# Patient Record
Sex: FEMALE | Race: WHITE | NOT HISPANIC OR LATINO | Employment: FULL TIME | URBAN - METROPOLITAN AREA
[De-identification: names, ages, dates, MRNs, and addresses within clinical notes are randomized per-mention and may not be internally consistent; named-entity substitution may affect disease eponyms.]

---

## 2023-06-15 DIAGNOSIS — O34.31 INCOMPETENT CERVIX IN PREGNANCY, ANTEPARTUM, FIRST TRIMESTER: Primary | ICD-10-CM

## 2023-06-19 ENCOUNTER — OFFICE VISIT (OUTPATIENT)
Dept: MATERNAL FETAL MEDICINE | Facility: CLINIC | Age: 23
End: 2023-06-19
Payer: MEDICAID

## 2023-06-19 ENCOUNTER — PROCEDURE VISIT (OUTPATIENT)
Dept: MATERNAL FETAL MEDICINE | Facility: CLINIC | Age: 23
End: 2023-06-19
Payer: MEDICAID

## 2023-06-19 VITALS
HEART RATE: 69 BPM | BODY MASS INDEX: 19.83 KG/M2 | WEIGHT: 119 LBS | SYSTOLIC BLOOD PRESSURE: 114 MMHG | HEIGHT: 65 IN | DIASTOLIC BLOOD PRESSURE: 76 MMHG

## 2023-06-19 DIAGNOSIS — O09.211 SUPERVISION OF PREGNANCY WITH HISTORY OF PRE-TERM LABOR IN FIRST TRIMESTER: ICD-10-CM

## 2023-06-19 DIAGNOSIS — O35.8XX0 CYSTIC HYGROMA OF FETUS IN SINGLETON PREGNANCY: ICD-10-CM

## 2023-06-19 DIAGNOSIS — O34.31 INCOMPETENT CERVIX IN PREGNANCY, ANTEPARTUM, FIRST TRIMESTER: ICD-10-CM

## 2023-06-19 PROCEDURE — 76801 OB US < 14 WKS SINGLE FETUS: CPT | Mod: S$GLB,,, | Performed by: OBSTETRICS & GYNECOLOGY

## 2023-06-19 PROCEDURE — 99204 PR OFFICE/OUTPT VISIT, NEW, LEVL IV, 45-59 MIN: ICD-10-PCS | Mod: TH,S$GLB,, | Performed by: OBSTETRICS & GYNECOLOGY

## 2023-06-19 PROCEDURE — 99204 OFFICE O/P NEW MOD 45 MIN: CPT | Mod: TH,S$GLB,, | Performed by: OBSTETRICS & GYNECOLOGY

## 2023-06-19 PROCEDURE — 1159F MED LIST DOCD IN RCRD: CPT | Mod: CPTII,S$GLB,, | Performed by: OBSTETRICS & GYNECOLOGY

## 2023-06-19 PROCEDURE — 3078F DIAST BP <80 MM HG: CPT | Mod: CPTII,S$GLB,, | Performed by: OBSTETRICS & GYNECOLOGY

## 2023-06-19 PROCEDURE — 1159F PR MEDICATION LIST DOCUMENTED IN MEDICAL RECORD: ICD-10-PCS | Mod: CPTII,S$GLB,, | Performed by: OBSTETRICS & GYNECOLOGY

## 2023-06-19 PROCEDURE — 3078F PR MOST RECENT DIASTOLIC BLOOD PRESSURE < 80 MM HG: ICD-10-PCS | Mod: CPTII,S$GLB,, | Performed by: OBSTETRICS & GYNECOLOGY

## 2023-06-19 PROCEDURE — 3008F PR BODY MASS INDEX (BMI) DOCUMENTED: ICD-10-PCS | Mod: CPTII,S$GLB,, | Performed by: OBSTETRICS & GYNECOLOGY

## 2023-06-19 PROCEDURE — 3074F PR MOST RECENT SYSTOLIC BLOOD PRESSURE < 130 MM HG: ICD-10-PCS | Mod: CPTII,S$GLB,, | Performed by: OBSTETRICS & GYNECOLOGY

## 2023-06-19 PROCEDURE — 3008F BODY MASS INDEX DOCD: CPT | Mod: CPTII,S$GLB,, | Performed by: OBSTETRICS & GYNECOLOGY

## 2023-06-19 PROCEDURE — 3074F SYST BP LT 130 MM HG: CPT | Mod: CPTII,S$GLB,, | Performed by: OBSTETRICS & GYNECOLOGY

## 2023-06-19 PROCEDURE — 76801 PR US, OB <14WKS, TRANSABD, SINGLE GESTATION: ICD-10-PCS | Mod: S$GLB,,, | Performed by: OBSTETRICS & GYNECOLOGY

## 2023-06-19 PROCEDURE — 1160F RVW MEDS BY RX/DR IN RCRD: CPT | Mod: CPTII,S$GLB,, | Performed by: OBSTETRICS & GYNECOLOGY

## 2023-06-19 PROCEDURE — 1160F PR REVIEW ALL MEDS BY PRESCRIBER/CLIN PHARMACIST DOCUMENTED: ICD-10-PCS | Mod: CPTII,S$GLB,, | Performed by: OBSTETRICS & GYNECOLOGY

## 2023-06-19 RX ORDER — PROGESTERONE 200 MG/1
CAPSULE ORAL
COMMUNITY
End: 2024-03-06 | Stop reason: SDUPTHER

## 2023-06-19 NOTE — PROGRESS NOTES
Maternal Fetal Medicine New Consult    Subjective     Patient ID: Latoya Gupta is a 23 y.o. female  at 11w2d gestation with Estimated Date of Delivery: 24  who is here for consultation by M.    Chief Complaint: MFM CONSULT  (CONSULT FOR PROBABLE INCOMPETENT CX )      Pregnancy complications include:   Patient Active Problem List   Diagnosis    Supervision of pregnancy with history of pre-term labor in first trimester    Cystic hygroma of fetus in wolf pregnancy        HPI: HPI 23 y.o.  female  at 11w2d gestation with Estimated Date of Delivery: 24 by LMP, consistent with early US. She is sent for MFM consultation for probable incompetent cervix. She has history of  delivery at 20 weeks that was preceded by cramping and bleeding. She reports she went to hospital and cervix was 2 cm via TVUS. During TVUS she experienced PPROM and had subsequent delivery. She is currently on PO progesterone. She reports history of thyroid nodule. She follows with endocrinologist every 6 months and reports no need for surgery or medication. She denies any personal or family history of aneuploidy or anomalies. She denies any exposure to high fevers, viral rashes, illicit drugs or alcohol in this pregnancy.  She denies any leaking fluid, vaginal bleeding, contractions, decreased fetal movement. Denies headaches, visual disturbances, or epigastric pain.  Patient was accompanied by her mother Mariam    Past Medical History:   Diagnosis Date    Thyroid disease     MASS ON THYROID       History reviewed. No pertinent surgical history.    Family History   Problem Relation Age of Onset     labor Maternal Grandmother     Miscarriages / Stillbirths Maternal Grandmother     Thyroid cancer Mother        Social History     Socioeconomic History    Marital status: Significant Other   Tobacco Use    Smoking status: Never     Passive exposure: Never    Smokeless tobacco: Never   Substance and Sexual Activity     Alcohol use: Not Currently    Drug use: Never    Sexual activity: Yes       Current Outpatient Medications   Medication Sig Dispense Refill    prenatal vit/iron fum/folic ac (PRENATAL 1+1 ORAL) Take by mouth.      progesterone (PROMETRIUM) 200 MG capsule progesterone micronized 200 mg capsule       No current facility-administered medications for this visit.       Review of patient's allergies indicates:  No Known Allergies    Medications:  Current Outpatient Medications   Medication Instructions    prenatal vit/iron fum/folic ac (PRENATAL 1+1 ORAL) Oral    progesterone (PROMETRIUM) 200 MG capsule progesterone micronized 200 mg capsule         Review of Systems   Constitutional:  Negative for activity change, chills, fatigue and fever.   HENT:  Negative for nasal congestion.    Eyes:  Negative for visual disturbance.   Respiratory:  Negative for cough, shortness of breath and wheezing.    Cardiovascular:  Negative for chest pain, palpitations and leg swelling.   Gastrointestinal:  Negative for abdominal pain, constipation, diarrhea, nausea, vomiting and reflux.   Endocrine: Negative for diabetes, hyperthyroidism and hypothyroidism.   Genitourinary:  Negative for bladder incontinence, frequency, hematuria, pelvic pain, urgency, vaginal bleeding, vaginal discharge and vaginal pain.   Musculoskeletal:  Negative for back pain, joint swelling and leg pain.   Integumentary:  Negative for rash.   Neurological:  Negative for seizures and headaches.   Psychiatric/Behavioral:  Negative for depression. The patient is not nervous/anxious.    All other systems reviewed and are negative.        Objective     Physical Exam  Vitals and nursing note reviewed.   Constitutional:       General: She is not in acute distress.     Appearance: Normal appearance.   HENT:      Head: Normocephalic and atraumatic.      Nose: Nose normal. No congestion or epistaxis.      Mouth/Throat:      Pharynx: Oropharynx is clear.   Eyes:      General:  No scleral icterus.     Pupils: Pupils are equal, round, and reactive to light.   Cardiovascular:      Rate and Rhythm: Normal rate and regular rhythm.   Pulmonary:      Effort: No respiratory distress.      Breath sounds: Normal breath sounds. No wheezing.   Abdominal:      General: Abdomen is flat.      Palpations: Abdomen is soft.      Tenderness: There is no abdominal tenderness. There is no right CVA tenderness, left CVA tenderness or guarding.      Comments: No CVA tenderness gravid uterus.    Musculoskeletal:         General: Normal range of motion.      Cervical back: Neck supple.      Right lower leg: No edema.      Left lower leg: No edema.   Skin:     General: Skin is warm.      Findings: No bruising or rash.   Neurological:      General: No focal deficit present.      Mental Status: She is oriented to person, place, and time.      Deep Tendon Reflexes: Reflexes normal.      Comments: Normal reflexes   Psychiatric:         Mood and Affect: Mood normal.         Behavior: Behavior normal.         Thought Content: Thought content normal.         Judgment: Judgment normal.          Assessment and Plan     ASSESSMENT/PLAN:     23 y.o.  female with IUP at 11w2d    Supervision of pregnancy with history of pre-term labor in first trimester  I discussed with her the increased risk of recurrence of another  delivery with risk around 1/3. The risk ranges from 15% with one previous  delivery after 32 weeks that was followed by a term at birth to 60% with history of 2 consecutive  deliveries before 32 weeks. I have shared with her that the  delivery could occur at an earlier gestational age with more significant morbidity and high risk of  mortality associated with that.    Discussed withdrawal of 17P by FDA and new guidelines for management of previous  delivery. Recommend intermittent transvaginal ultrasounds starting at 16 weeks until 23 weeks. If cervical length  is less than 3 cm, weekly TVUS is recommended and may consider vaginal progesterone nightly. If cervix is less than 25 mm, consider cerclage, especially if previous  delivery less 28 weeks, than along with continuing vaginal progesterone nightly. Conversely, may consider nightly vaginal progesterone nightly starting at 16 weeks until 37 weeks regardless of cervical length, although no data of benefit if cervix longer than 3 cm. Discussed risks/benefits of all options, understanding that neither option is fully protective against pregnancy loss. She would like to do cervical surveillance when indicated with consideration for vaginal progesterone at that time.    Discussed risks/benefits of PO progesterone supplementation, and she would like to continue until 12 weeks.     Will plan to check cervical length in 4 weeks at level 2 scan. If cervical length less than 3 cm, will consider vaginal progesterone at that time. If cervical length less than 2.5 cm, consider cerclage along with vaginal progesterone. PTL precautions given.     Extensively discussed with her that the history is not that of cervical incompetence where she presented with cramps and bleeding and ruptured membranes and delivering.  Discussed the alternative of a cerclage versus serial cervical surveillance and agreed to do the latter that the best option of management.  Patient her mother both agree with plan of care.  Unfortunately the cystic hygroma complicates the management and makes risk of adverse pregnancy outcome significantly higher.    Cystic hygroma of fetus in wolf pregnancy  Cystic hygroma  No evidence of hydrops at this time.    I discussed with her at length the high association of cystic hygroma at this early gestational age with aneuploidy, particularly Down syndrome and Ramirez syndrome.  The association with other anomalies especially congenital heart disease, higher risk of spontaneous miscarriage and pregnancy loss with  latterly developing nonimmune hydrops was discussed, that increase the risk of fetal demise and  death.  Increasing size is associated with abnormal outcome.  I also discussed the rare possibility of resolution if no other abnormalities or aneuploidy are present, as well as potentially good prognosis if no other abnormalities are present.  Fetal echocardiography will be done around 18-22 weeks.      I discussed the option of referral for [facility] for early genetic testing with benefits and risks discussed and patient declined that option.    She was advised of the alternative of later genetic amniocentesis. Benefits and risks of a genetic amniocentesis were discussed. Patient was offered genetic amniocentesis, after thorough counseling on benefits and risks of procedure, with very remote risk of complications and in some studies no identifiable increased risk, above background risk of spontaneous miscarriage, while some current data suggests risk up to 1 in 800 with early amniocentesis prior to 24 weeks, and remote risk of need for emergency delivery with all the complications of prematurity with amniocentesis after 24 weeks. She declined amniocentesis .  She is aware of need for  evaluation.. I also discussed with them that a normal chromosome analysis will not detect other genetic diseases or more subtle chromosomal abnormalities like microdeletions or duplications.        She was counseled on Cell free DNA understanding that it is an enhanced screening test but not a diagnostic test. It assesses risk for common aneuploidies such as Trisomy 13, 18, and 21 by evaluating cell-free fetal DNA in maternal circulation with a false positive rate less than 0.5% for Trisomy 21 and less reliable for Trisomy 13 and Trisomy 18. She accepted cfDNA and will have done with primary OB.    The option of termination of pregnancy prior to 24 weeks was discussed, and she would not consider termination of pregnancy even if  anomalies or aneuploidy is detected .. If chromosome abnormality is present, then the risk of recurrence will be only 1%. Otherwise the risk may be a little higher if no chromosomal abnormality is noted. No higher risk above age related risk is present if fetus had Ramirez syndrome.      I discussed with her that if fetal demise occurs during follow up, and She has not done genetic testing, she could let me know as we could do an amniocentesis to check chromosomes prior to managing the miscarriage/fetal demise in view of the yield on chromosome analysis being best with amniocentesis in that setting.       Discussed with Dr. Mcmlilan that will do the blood test tomorrow        Follow up in about 4 weeks (around 7/17/2023) for MFM follow-up, Level 2 scan, Transvaginal ultrasound, Level 2 ultrasound.           Components of this note were documented using voice recognition systems; and are subject to errors not corrected at proof reading.  Please contact the author for any clarifications.

## 2023-06-19 NOTE — ASSESSMENT & PLAN NOTE
I discussed with her the increased risk of recurrence of another  delivery with risk around 1/3. The risk ranges from 15% with one previous  delivery after 32 weeks that was followed by a term at birth to 60% with history of 2 consecutive  deliveries before 32 weeks. I have shared with her that the  delivery could occur at an earlier gestational age with more significant morbidity and high risk of  mortality associated with that.    Discussed withdrawal of 17P by FDA and new guidelines for management of previous  delivery. Recommend intermittent transvaginal ultrasounds starting at 16 weeks until 23 weeks. If cervical length is less than 3 cm, weekly TVUS is recommended and may consider vaginal progesterone nightly. If cervix is less than 25 mm, consider cerclage, especially if previous  delivery less 28 weeks, than along with continuing vaginal progesterone nightly. Conversely, may consider nightly vaginal progesterone nightly starting at 16 weeks until 37 weeks regardless of cervical length, although no data of benefit if cervix longer than 3 cm. Discussed risks/benefits of all options, understanding that neither option is fully protective against pregnancy loss. She would like to do cervical surveillance when indicated with consideration for vaginal progesterone at that time.    Discussed risks/benefits of PO progesterone supplementation, and she would like to continue until 12 weeks.     Will plan to check cervical length in 4 weeks at level 2 scan. If cervical length less than 3 cm, will consider vaginal progesterone at that time. If cervical length less than 2.5 cm, consider cerclage along with vaginal progesterone. PTL precautions given.     Extensively discussed with her that the history is not that of cervical incompetence where she presented with cramps and bleeding and ruptured membranes and delivering.  Discussed the alternative of a cerclage versus  serial cervical surveillance and agreed to do the latter that the best option of management.  Patient her mother both agree with plan of care.  Unfortunately the cystic hygroma complicates the management and makes risk of adverse pregnancy outcome significantly higher.

## 2023-06-19 NOTE — ASSESSMENT & PLAN NOTE
Cystic hygroma  No evidence of hydrops at this time.    I discussed with her at length the high association of cystic hygroma at this early gestational age with aneuploidy, particularly Down syndrome and Ramirez syndrome.  The association with other anomalies especially congenital heart disease, higher risk of spontaneous miscarriage and pregnancy loss with latterly developing nonimmune hydrops was discussed, that increase the risk of fetal demise and  death.  Increasing size is associated with abnormal outcome.  I also discussed the rare possibility of resolution if no other abnormalities or aneuploidy are present, as well as potentially good prognosis if no other abnormalities are present.  Fetal echocardiography will be done around 18-22 weeks.      I discussed the option of referral for [facility] for early genetic testing with benefits and risks discussed and patient declined that option.    She was advised of the alternative of later genetic amniocentesis. Benefits and risks of a genetic amniocentesis were discussed. Patient was offered genetic amniocentesis, after thorough counseling on benefits and risks of procedure, with very remote risk of complications and in some studies no identifiable increased risk, above background risk of spontaneous miscarriage, while some current data suggests risk up to 1 in 800 with early amniocentesis prior to 24 weeks, and remote risk of need for emergency delivery with all the complications of prematurity with amniocentesis after 24 weeks. She declined amniocentesis .  She is aware of need for  evaluation.. I also discussed with them that a normal chromosome analysis will not detect other genetic diseases or more subtle chromosomal abnormalities like microdeletions or duplications.        She was counseled on Cell free DNA understanding that it is an enhanced screening test but not a diagnostic test. It assesses risk for common aneuploidies such as Trisomy 13, 18,  and 21 by evaluating cell-free fetal DNA in maternal circulation with a false positive rate less than 0.5% for Trisomy 21 and less reliable for Trisomy 13 and Trisomy 18. She accepted cfDNA and will have done with primary OB.    The option of termination of pregnancy prior to 24 weeks was discussed, and she would not consider termination of pregnancy even if anomalies or aneuploidy is detected .. If chromosome abnormality is present, then the risk of recurrence will be only 1%. Otherwise the risk may be a little higher if no chromosomal abnormality is noted. No higher risk above age related risk is present if fetus had Ramirez syndrome.      I discussed with her that if fetal demise occurs during follow up, and She has not done genetic testing, she could let me know as we could do an amniocentesis to check chromosomes prior to managing the miscarriage/fetal demise in view of the yield on chromosome analysis being best with amniocentesis in that setting.

## 2024-03-14 PROBLEM — O09.211 SUPERVISION OF PREGNANCY WITH HISTORY OF PRE-TERM LABOR IN FIRST TRIMESTER: Status: RESOLVED | Noted: 2023-06-19 | Resolved: 2024-03-14

## 2024-03-14 PROBLEM — Z34.90 EARLY STAGE OF PREGNANCY: Status: ACTIVE | Noted: 2024-03-14

## 2024-03-14 PROBLEM — D68.61 ANTI-PHOSPHOLIPID SYNDROME: Status: ACTIVE | Noted: 2024-03-14

## 2024-03-14 PROBLEM — N96 HISTORY OF MULTIPLE MISCARRIAGES: Status: ACTIVE | Noted: 2024-03-14

## 2024-03-14 PROBLEM — O35.8XX0 CYSTIC HYGROMA OF FETUS IN SINGLETON PREGNANCY: Status: RESOLVED | Noted: 2023-06-19 | Resolved: 2024-03-14

## 2024-03-28 PROBLEM — O03.9 SAB (SPONTANEOUS ABORTION): Status: ACTIVE | Noted: 2024-03-28

## 2024-03-28 PROBLEM — Z34.90 EARLY STAGE OF PREGNANCY: Status: RESOLVED | Noted: 2024-03-14 | Resolved: 2024-03-28

## 2024-05-01 PROBLEM — O09.91 SUPERVISION OF HIGH RISK PREGNANCY IN FIRST TRIMESTER: Status: ACTIVE | Noted: 2024-05-01

## 2024-05-01 PROBLEM — O03.9 SAB (SPONTANEOUS ABORTION): Status: RESOLVED | Noted: 2024-03-28 | Resolved: 2024-05-01

## 2024-05-21 DIAGNOSIS — D89.82 AUTOIMMUNE LYMPHOPROLIFERATIVE SYNDROME (ALPS): Primary | ICD-10-CM

## 2024-05-22 ENCOUNTER — OFFICE VISIT (OUTPATIENT)
Dept: MATERNAL FETAL MEDICINE | Facility: CLINIC | Age: 24
End: 2024-05-22
Payer: MEDICAID

## 2024-05-22 ENCOUNTER — PROCEDURE VISIT (OUTPATIENT)
Dept: MATERNAL FETAL MEDICINE | Facility: CLINIC | Age: 24
End: 2024-05-22
Payer: MEDICAID

## 2024-05-22 VITALS
WEIGHT: 118.69 LBS | HEART RATE: 83 BPM | DIASTOLIC BLOOD PRESSURE: 73 MMHG | HEIGHT: 65 IN | SYSTOLIC BLOOD PRESSURE: 107 MMHG | BODY MASS INDEX: 19.78 KG/M2

## 2024-05-22 DIAGNOSIS — O99.119 ANTIPHOSPHOLIPID SYNDROME DURING PREGNANCY: ICD-10-CM

## 2024-05-22 DIAGNOSIS — O34.30 CERVICAL INSUFFICIENCY DURING PREGNANCY, ANTEPARTUM: Primary | ICD-10-CM

## 2024-05-22 DIAGNOSIS — O26.20 RECURRENT PREGNANCY LOSS, CURRENTLY PREGNANT: ICD-10-CM

## 2024-05-22 DIAGNOSIS — E04.1 THYROID NODULE: ICD-10-CM

## 2024-05-22 DIAGNOSIS — D68.61 ANTIPHOSPHOLIPID SYNDROME DURING PREGNANCY: ICD-10-CM

## 2024-05-22 DIAGNOSIS — D89.82 AUTOIMMUNE LYMPHOPROLIFERATIVE SYNDROME (ALPS): ICD-10-CM

## 2024-05-22 PROCEDURE — 3008F BODY MASS INDEX DOCD: CPT | Mod: CPTII,S$GLB,, | Performed by: OBSTETRICS & GYNECOLOGY

## 2024-05-22 PROCEDURE — 76801 OB US < 14 WKS SINGLE FETUS: CPT | Mod: S$GLB,,, | Performed by: OBSTETRICS & GYNECOLOGY

## 2024-05-22 PROCEDURE — 3074F SYST BP LT 130 MM HG: CPT | Mod: CPTII,S$GLB,, | Performed by: OBSTETRICS & GYNECOLOGY

## 2024-05-22 PROCEDURE — 1159F MED LIST DOCD IN RCRD: CPT | Mod: CPTII,S$GLB,, | Performed by: OBSTETRICS & GYNECOLOGY

## 2024-05-22 PROCEDURE — 99204 OFFICE O/P NEW MOD 45 MIN: CPT | Mod: TH,S$GLB,, | Performed by: OBSTETRICS & GYNECOLOGY

## 2024-05-22 PROCEDURE — 3078F DIAST BP <80 MM HG: CPT | Mod: CPTII,S$GLB,, | Performed by: OBSTETRICS & GYNECOLOGY

## 2024-05-22 PROCEDURE — 1160F RVW MEDS BY RX/DR IN RCRD: CPT | Mod: CPTII,S$GLB,, | Performed by: OBSTETRICS & GYNECOLOGY

## 2024-05-22 NOTE — PROGRESS NOTES
MATERNAL-FETAL MEDICINE   CONSULT NOTE    Provider requesting consultation: Dr Caraballo    SUBJECTIVE:     Ms. Latoya Gupta is a 24 y.o.  female with IUP at 8w0d who is seen in consultation by MFM for evaluation and management of:  Problem   - Cervical insufficiency during pregnancy, antepartum   - Recurrent pregnancy loss, currently pregnant   - Antiphospholipid syndrome during pregnancy   - Thyroid nodule     Latoya is being referred for a history of recurrent pregnancy loss.  Her 1st pregnancy was an early SAB.  Her 2nd pregnancy was a previable spontaneous  delivery at 20 weeks.  With that pregnancy, a short cervix was noted 2 days prior to premature rupture of membranes and delivery.  With her 3rd pregnancy, she had a partial molar pregnancy with IUFD at 13 weeks.  Due to her obstetrical history, RPL testing was performed by her current primary OB in the beginning of 2024.  APLS testing revealed a positive beta 2 glycoprotein IgG and IgA.  Other lab eval at that time was unremarkable.  Once she had a positive UPT with her current pregnancy, Lovenox 40 mg once daily subcutaneous injections, low-dose aspirin, and vaginal progesterone 200 mg QHS was initiated.  She reports a history of a thyroid nodule of which she had a consultation with endocrinology.  After initial consultation, the endocrinologist moved out of the country and she did not follow-up.  She reports her mother had thyroid cancer.  During RPL workup in March, TSH was noted to be normal.       Medication List with Changes/Refills   Current Medications    ASPIRIN (ECOTRIN) 81 MG EC TABLET    Take 1 tablet (81 mg total) by mouth once daily.    CHOLECALCIFEROL, VITAMIN D3, 1,250 MCG (50,000 UNIT) CAPSULE    Take 50,000 Units by mouth once a week.    ENOXAPARIN (LOVENOX) 40 MG/0.4 ML SYRG    Inject 0.4 mLs (40 mg total) into the skin once daily.    PRENATAL VIT NO.130-IRON-FOLIC (PRENATAL VITAMIN) 27 MG IRON- 800 MCG TAB    Take 1 tablet  "by mouth Daily.    PRENATAL VIT/IRON FUM/FOLIC AC (PRENATAL 1+1 ORAL)    Take by mouth.    PROGESTERONE (PROMETRIUM) 200 MG CAPSULE    Place 1 capsule (200 mg total) vaginally every evening. Until 36 weeks       Review of patient's allergies indicates:  No Known Allergies    PMH:  Past Medical History:   Diagnosis Date    Thyroid disease     MASS ON THYROID       PObHx:  OB History    Para Term  AB Living   5 1   1 3     SAB IAB Ectopic Multiple Live Births   3       1      # Outcome Date GA Lbr Kemal/2nd Weight Sex Type Anes PTL Lv   5 Current            4 2024 6w0d    SAB   FD   3 SAB 24 13w6d   F    FD      Birth Comments: D&C, partial molar   2  20 20w5d 28:40 0.369 kg (13 oz) M Vag-Spont EPI Y ND      Birth Comments: incompetant cervix      Complications:  premature rupture of membranes (PPROM) with unknown onset of labor   1 2019 6w0d      Y FD       PSH:  Past Surgical History:   Procedure Laterality Date    DILATION AND CURETTAGE OF UTERUS  2023       Family history:family history includes Breast cancer in her maternal grandmother; Miscarriages / Stillbirths in her maternal grandmother;  labor in her maternal grandmother; Thyroid cancer in her mother.    Social history: reports that she has never smoked. She has never been exposed to tobacco smoke. She has never used smokeless tobacco. She reports that she does not currently use alcohol. She reports that she does not use drugs.    Genetic history: Previous partial molar pregnancy. The patient denies any inherited genetic diseases or birth defects in herself or her partner's personal history or family.    Objective:   /73 (BP Location: Right arm)   Pulse 83   Ht 5' 5" (1.651 m)   Wt 53.8 kg (118 lb 11.5 oz)   BMI 19.76 kg/m²     Ultrasound performed. See viewpoint for full ultrasound report.    A wolf living IUP is identified, and the CRL is appropriate for established gestational " age. The fetal pole may have umbilical cord cyst versus abdominal wall cyst present. Maternal structures such as uterus, cervix, and ovaries appear normal.    ASSESSMENT/PLAN:     24 y.o.  female with IUP at 8w0d     - Cervical insufficiency during pregnancy, antepartum  I reviewed the patient's history which is remarkable for delivery at 20.5 weeks gestation. A short cervix was noted at 20.3w, then, two days later, she PPROM'ed and delivered.  We reviewed the concept of cervical insufficiency and how it differs from  labor. We discussed recommendations for management which include history indicated cervical cerclage placement. We reviewed the risks of cerclage procedure in detail. We discussed recommendations for pelvic rest, avoidance of moderate to high impact exercise, and no heavy lifting following cerclage placement. Progesterone supplementation is not indicated in absence of clinical concern for  labor signs or symptoms.     Patient was counseled on r/b/i/a of cerclage which include but are not limited to risk of anesthesia, pain, bleeding, infection, injury to adjacent structures, rupture of membranes, potential inability to place, and potential failure to prevent  delivery.    We plan to re-evaluate early fetal anatomical structure in 3-4 weeks as well as recommend NIPT testing. If all is normal, will plan for cervical cerclage placement at 12-14 weeks EGA.    Recommendations:  MFM to schedule outpatient cervical cerclage placement   Currently taking vaginal progesterone 200mg QHS   Follow-up with MFM 1-2 weeks after cerclage for post-operative exam  Monitor for signs of symptoms of  labor (LOF, vaginal bleeding, regular contractions) and signs of infection  To OB ED with concern for  labor or infection following cerclage placement  Remove cerclage at 36-37 weeks, unless indicated earlier      - Recurrent pregnancy loss, currently pregnant  She's had one partial  molar pregnancy @ 13w, one early SAB, and one previable sPTB at 20w d/t cervical insufficiency.    Miscarriage is the most common complication of early pregnancy. An estimated 8-20% of clinically recognized pregnancies less than 20 weeks of gestation will miscarry. 80% of miscarriages happen in the first 12 weeks of gestation. Chromosomal abnormalities account for approximately 50% of all miscarriages and the earlier the gestational age at miscarriage the higher the incidence. Other etiologies for miscarriage include congenital anomalies, maternal uterine anomalies, poorly controlled thyroid disease or diabetes, and acute maternal infection. Some miscarriages are unexplained. Recurrent pregnancy loss (RPL) refers to three or more consecutive losses of clinically recognized pregnancies prior to 20 weeks of gestation. While approximately 15% of women experience 1 miscarriage, only 2% experience 2 consecutive losses and less than 1% experience 3 consecutive losses.   In women with a history of two or more prior losses, the chance of a viable birth after ultrasound  detection of fetal cardiac activity is approximately 77%. ?  After evaluation, recurrent pregnancy loss remains unexplained in approximately one-half of couples. Nevertheless, the chance of a live birth after three unexplained RPLs is over 50 percent with no intervention.     3/5/22- Beta 2 glycoprotein I Ab IgG elevated (22), Beta 2 glycoprotein I Ab IgA elevated (33) - see separate documentation    Recommendations:  Repeat testing 12 week after initial draw (around 6/5/24) with anticardiolipin antibody (IgG and IgM) titer, lupus anticoagulant, and beta-2 glycoprotein IgG and IgM titersAn inherited thrombophilia panel is not recommended for RPLEvaluation of thyroid function with TSH and for maternal diabetes with 2 hour glucose tolerance test or hemoglobin A1C (completed 3/5/24)        - Antiphospholipid syndrome during pregnancy  I reviewed the risks of  APLS with the patient.  She is aware of the increased risk of venous thromboembolic disease and stroke.  We discussed the pregnancy risks which include prematurity, IUGR, preeclampsia, and miscarriage/stillbirth.   3/5/24- Beta 2 glycoprotein positive.     At this time, the diagnosis of APLS is unclear as her history of pregnancy loss is variable in etiology (1 partial molar pregnancy, 1 early SAB, 1 incompetent cx/sPTB at 20w) and she has only had one positive lab eval. Will continue Lovenox at this time and plan for repeat labs 12w after initial assessment. Further recs will be provided after repeat labs obtained.      - Thyroid nodule  She reports a history of a thyroid nodule. Her mother had thyroid cancer. She states she had a consult with an endocrinologist. However, he moved out of the country and she did not have a follow up appointment with him.   3/5/24- TSH 1.5    Now that she's pregnant, recommend repeat TFT eval at end of the first trimester.      FOLLOW UP:   F/u in 3-4 weeks for US/MFM visit    This consultation was completed with the assistance of Queenie Lieberman NP.      Nahomi Chun MD  Maternal Fetal Medicine

## 2024-05-22 NOTE — ASSESSMENT & PLAN NOTE
She's had one partial molar pregnancy @ 13w, one early SAB, and one previable sPTB at 20w d/t cervical insufficiency.    Miscarriage is the most common complication of early pregnancy. An estimated 8-20% of clinically recognized pregnancies less than 20 weeks of gestation will miscarry. 80% of miscarriages happen in the first 12 weeks of gestation. Chromosomal abnormalities account for approximately 50% of all miscarriages and the earlier the gestational age at miscarriage the higher the incidence. Other etiologies for miscarriage include congenital anomalies, maternal uterine anomalies, poorly controlled thyroid disease or diabetes, and acute maternal infection. Some miscarriages are unexplained. Recurrent pregnancy loss (RPL) refers to three or more consecutive losses of clinically recognized pregnancies prior to 20 weeks of gestation. While approximately 15% of women experience 1 miscarriage, only 2% experience 2 consecutive losses and less than 1% experience 3 consecutive losses.   In women with a history of two or more prior losses, the chance of a viable birth after ultrasound  detection of fetal cardiac activity is approximately 77%. ?  After evaluation, recurrent pregnancy loss remains unexplained in approximately one-half of couples. Nevertheless, the chance of a live birth after three unexplained RPLs is over 50 percent with no intervention.     3/5/22- Beta 2 glycoprotein I Ab IgG elevated (22), Beta 2 glycoprotein I Ab IgA elevated (33) - see separate documentation    Recommendations:  Repeat testing 12 week after initial draw (around 6/5/24) with anticardiolipin antibody (IgG and IgM) titer, lupus anticoagulant, and beta-2 glycoprotein IgG and IgM titersAn inherited thrombophilia panel is not recommended for RPLEvaluation of thyroid function with TSH and for maternal diabetes with 2 hour glucose tolerance test or hemoglobin A1C (completed 3/5/24)

## 2024-05-22 NOTE — ASSESSMENT & PLAN NOTE
I reviewed the patient's history which is remarkable for delivery at 20.5 weeks gestation. A short cervix was noted at 20.3w, then, two days later, she PPROM'ed and delivered.  We reviewed the concept of cervical insufficiency and how it differs from  labor. We discussed recommendations for management which include history indicated cervical cerclage placement. We reviewed the risks of cerclage procedure in detail. We discussed recommendations for pelvic rest, avoidance of moderate to high impact exercise, and no heavy lifting following cerclage placement. Progesterone supplementation is not indicated in absence of clinical concern for  labor signs or symptoms.     Patient was counseled on r/b/i/a of cerclage which include but are not limited to risk of anesthesia, pain, bleeding, infection, injury to adjacent structures, rupture of membranes, potential inability to place, and potential failure to prevent  delivery.    We plan to re-evaluate early fetal anatomical structure in 3-4 weeks as well as recommend NIPT testing. If all is normal, will plan for cervical cerclage placement at 12-14 weeks EGA.    Recommendations:  MF to schedule outpatient cervical cerclage placement   Currently taking vaginal progesterone 200mg QHS   Follow-up with MFM 1-2 weeks after cerclage for post-operative exam  Monitor for signs of symptoms of  labor (LOF, vaginal bleeding, regular contractions) and signs of infection  To OB ED with concern for  labor or infection following cerclage placement  Remove cerclage at 36-37 weeks, unless indicated earlier

## 2024-05-22 NOTE — ASSESSMENT & PLAN NOTE
I reviewed the risks of APLS with the patient.  She is aware of the increased risk of venous thromboembolic disease and stroke.  We discussed the pregnancy risks which include prematurity, IUGR, preeclampsia, and miscarriage/stillbirth.   3/5/24- Beta 2 glycoprotein positive.     At this time, the diagnosis of APLS is unclear as her history of pregnancy loss is variable in etiology (1 partial molar pregnancy, 1 early SAB, 1 incompetent cx/sPTB at 20w) and she has only had one positive lab eval. Will continue Lovenox at this time and plan for repeat labs 12w after initial assessment. Further recs will be provided after repeat labs obtained.

## 2024-05-22 NOTE — ASSESSMENT & PLAN NOTE
She reports a history of a thyroid nodule. Her mother had thyroid cancer. She states she had a consult with an endocrinologist. However, he moved out of the country and she did not have a follow up appointment with him.   3/5/24- TSH 1.5    Now that she's pregnant, recommend repeat TFT eval at end of the first trimester.

## 2024-06-06 DIAGNOSIS — D68.61 ANTIPHOSPHOLIPID SYNDROME DURING PREGNANCY: ICD-10-CM

## 2024-06-06 DIAGNOSIS — O34.30 CERVICAL INSUFFICIENCY DURING PREGNANCY, ANTEPARTUM: Primary | ICD-10-CM

## 2024-06-06 DIAGNOSIS — O26.20 RECURRENT PREGNANCY LOSS, CURRENTLY PREGNANT: ICD-10-CM

## 2024-06-06 DIAGNOSIS — O99.119 ANTIPHOSPHOLIPID SYNDROME DURING PREGNANCY: ICD-10-CM

## 2024-06-10 ENCOUNTER — TELEPHONE (OUTPATIENT)
Dept: MATERNAL FETAL MEDICINE | Facility: CLINIC | Age: 24
End: 2024-06-10
Payer: MEDICAID

## 2024-06-12 ENCOUNTER — PROCEDURE VISIT (OUTPATIENT)
Dept: MATERNAL FETAL MEDICINE | Facility: CLINIC | Age: 24
End: 2024-06-12
Payer: MEDICAID

## 2024-06-12 ENCOUNTER — OFFICE VISIT (OUTPATIENT)
Dept: MATERNAL FETAL MEDICINE | Facility: CLINIC | Age: 24
End: 2024-06-12
Payer: MEDICAID

## 2024-06-12 VITALS
BODY MASS INDEX: 19.91 KG/M2 | SYSTOLIC BLOOD PRESSURE: 105 MMHG | DIASTOLIC BLOOD PRESSURE: 70 MMHG | WEIGHT: 119.5 LBS | HEIGHT: 65 IN | HEART RATE: 89 BPM

## 2024-06-12 DIAGNOSIS — O34.30 CERVICAL INSUFFICIENCY DURING PREGNANCY, ANTEPARTUM: ICD-10-CM

## 2024-06-12 DIAGNOSIS — O99.119 ANTIPHOSPHOLIPID SYNDROME DURING PREGNANCY: Primary | ICD-10-CM

## 2024-06-12 DIAGNOSIS — R52 ACUTE PAIN: ICD-10-CM

## 2024-06-12 DIAGNOSIS — D68.61 ANTIPHOSPHOLIPID SYNDROME DURING PREGNANCY: ICD-10-CM

## 2024-06-12 DIAGNOSIS — O99.119 ANTIPHOSPHOLIPID SYNDROME DURING PREGNANCY: ICD-10-CM

## 2024-06-12 DIAGNOSIS — E04.1 THYROID NODULE: ICD-10-CM

## 2024-06-12 DIAGNOSIS — O26.20 RECURRENT PREGNANCY LOSS, CURRENTLY PREGNANT: ICD-10-CM

## 2024-06-12 DIAGNOSIS — D68.61 ANTIPHOSPHOLIPID SYNDROME DURING PREGNANCY: Primary | ICD-10-CM

## 2024-06-12 PROCEDURE — 1159F MED LIST DOCD IN RCRD: CPT | Mod: CPTII,S$GLB,, | Performed by: OBSTETRICS & GYNECOLOGY

## 2024-06-12 PROCEDURE — 3078F DIAST BP <80 MM HG: CPT | Mod: CPTII,S$GLB,, | Performed by: OBSTETRICS & GYNECOLOGY

## 2024-06-12 PROCEDURE — 1160F RVW MEDS BY RX/DR IN RCRD: CPT | Mod: CPTII,S$GLB,, | Performed by: OBSTETRICS & GYNECOLOGY

## 2024-06-12 PROCEDURE — 99215 OFFICE O/P EST HI 40 MIN: CPT | Mod: TH,S$GLB,, | Performed by: OBSTETRICS & GYNECOLOGY

## 2024-06-12 PROCEDURE — 3008F BODY MASS INDEX DOCD: CPT | Mod: CPTII,S$GLB,, | Performed by: OBSTETRICS & GYNECOLOGY

## 2024-06-12 PROCEDURE — 76813 OB US NUCHAL MEAS 1 GEST: CPT | Mod: S$GLB,,, | Performed by: OBSTETRICS & GYNECOLOGY

## 2024-06-12 PROCEDURE — 3074F SYST BP LT 130 MM HG: CPT | Mod: CPTII,S$GLB,, | Performed by: OBSTETRICS & GYNECOLOGY

## 2024-06-12 RX ORDER — OXYCODONE AND ACETAMINOPHEN 5; 325 MG/1; MG/1
1 TABLET ORAL EVERY 6 HOURS PRN
Qty: 5 TABLET | Refills: 0 | Status: SHIPPED | OUTPATIENT
Start: 2024-06-12

## 2024-06-12 NOTE — ASSESSMENT & PLAN NOTE
I reviewed the patient's history which is remarkable for delivery at 20.5 weeks gestation. A short cervix was noted at 20.3w, then, two days later, she PPROM'ed and delivered.  We reviewed the concept of cervical insufficiency and how it differs from  labor. We discussed recommendations for management which include history indicated cervical cerclage placement. We reviewed the risks of cerclage procedure in detail. We discussed recommendations for pelvic rest, avoidance of moderate to high impact exercise, and no heavy lifting following cerclage placement. Progesterone supplementation is not indicated in absence of clinical concern for  labor signs or symptoms.     Patient was counseled on r/b/i/a of cerclage which include but are not limited to risk of anesthesia, pain, bleeding, infection, injury to adjacent structures, rupture of membranes, potential inability to place, and potential failure to prevent  delivery.  We plan to re-evaluate early fetal anatomical structure in 3-4 weeks as well as recommend NIPT testing. If all is normal, will plan for cervical cerclage placement at 12-14 weeks EGA.    24- Ultrasound evaluation reveals normal anatomical structure at this very early gestational age. She understands limitations, particularly with early ultrasound. Cerclage planned outpatient at [facility]  @ 7a    Recommendations:  [facility] to schedule outpatient cervical cerclage placement - 24 at 7a  Currently taking vaginal progesterone 200mg QHS   Follow-up with MFM 1-2 weeks after cerclage for post-operative exam  Monitor for signs of symptoms of  labor (LOF, vaginal bleeding, regular contractions) and signs of infection  To OB ED with concern for  labor or infection following cerclage placement  Remove cerclage at 36-37 weeks, unless indicated earlier

## 2024-06-12 NOTE — PROGRESS NOTES
"Maternal Fetal Medicine follow up consult    SUBJECTIVE:     Latoya Gupta is a 24 y.o.  female with IUP at 11w0d who is seen in follow up consultation by MFM.  Pregnancy complications include:   Problem   - Cervical insufficiency during pregnancy, antepartum   - Recurrent pregnancy loss, currently pregnant   - Antiphospholipid syndrome during pregnancy   - Thyroid nodule     Latoya presents for routine follow up appointment.  She is doing well without complaints. NIPT low risk (46,XX)  Denies LOF, VB, contractions. Positive fetal movement.  Cerclage scheduled  at 7am.     Previous notes reviewed.   No changes to medical, surgical, family, social, or obstetric history.    Interval history since last [facility] visit: see above    Medications reviewed.    Care team members:  Dr Caraballo - Primary OB     OBJECTIVE:   /70 (BP Location: Right arm)   Pulse 89   Ht 5' 5" (1.651 m)   Wt 54.2 kg (119 lb 7.8 oz)   BMI 19.88 kg/m²     Physical Exam  Constitutional:       Appearance: Normal appearance. She is normal weight.   HENT:      Head: Normocephalic.      Mouth/Throat:      Mouth: Mucous membranes are moist.      Pharynx: Oropharynx is clear.   Eyes:      Conjunctiva/sclera: Conjunctivae normal.      Pupils: Pupils are equal, round, and reactive to light.   Cardiovascular:      Rate and Rhythm: Normal rate and regular rhythm.      Pulses: Normal pulses.      Heart sounds: Normal heart sounds.   Pulmonary:      Effort: Pulmonary effort is normal.      Breath sounds: Normal breath sounds.   Abdominal:      General: Bowel sounds are normal.      Palpations: Abdomen is soft.   Musculoskeletal:         General: Normal range of motion.      Cervical back: Normal range of motion.   Skin:     General: Skin is warm and dry.   Neurological:      General: No focal deficit present.      Mental Status: She is alert and oriented to person, place, and time.   Psychiatric:         Mood and Affect: Mood normal. "       Ultrasound performed. See viewpoint for full ultrasound report.    A wolf living IUP is identified, and the CRL is appropriate for established gestational age. The NT measures normally at 1.1mm Maternal structures such as uterus, cervix, and ovaries appear normal.    ASSESSMENT/PLAN:     24 y.o.  female with IUP at 11w0d    - Antiphospholipid syndrome during pregnancy  I reviewed the risks of APLS with the patient.  She is aware of the increased risk of venous thromboembolic disease and stroke.  We discussed the pregnancy risks which include prematurity, IUGR, preeclampsia, and miscarriage/stillbirth.   3/5/24- Beta 2 glycoprotein positive.     At this time, the diagnosis of APLS is unclear as her history of pregnancy loss is variable in etiology (1 partial molar pregnancy, 1 early SAB, 1 incompetent cx/sPTB at 20w) and she has only had one positive lab eval. Will continue Lovenox at this time and plan for repeat labs 12w after initial assessment. Further recs will be provided after repeat labs obtained.    24- She has an order to repeat APLS labs and she says she did them at [facility]. We are working on getting these records.  We discussed holding lovenox for cerclage and restarting that evening.     - Thyroid nodule  She reports a history of a thyroid nodule. Her mother had thyroid cancer. She states she had a consult with an endocrinologist. However, he moved out of the country and she did not have a follow up appointment with him.   3/5/24- TSH 1.5    Now that she's pregnant, recommend repeat TFT eval at end of the first trimester.    - Recurrent pregnancy loss, currently pregnant  She's had one partial molar pregnancy @ 13w, one early SAB, and one previable sPTB at 20w d/t cervical insufficiency.    Miscarriage is the most common complication of early pregnancy. An estimated 8-20% of clinically recognized pregnancies less than 20 weeks of gestation will miscarry. 80% of miscarriages happen  in the first 12 weeks of gestation. Chromosomal abnormalities account for approximately 50% of all miscarriages and the earlier the gestational age at miscarriage the higher the incidence. Other etiologies for miscarriage include congenital anomalies, maternal uterine anomalies, poorly controlled thyroid disease or diabetes, and acute maternal infection. Some miscarriages are unexplained. Recurrent pregnancy loss (RPL) refers to three or more consecutive losses of clinically recognized pregnancies prior to 20 weeks of gestation. While approximately 15% of women experience 1 miscarriage, only 2% experience 2 consecutive losses and less than 1% experience 3 consecutive losses.   In women with a history of two or more prior losses, the chance of a viable birth after ultrasound  detection of fetal cardiac activity is approximately 77%. ?  After evaluation, recurrent pregnancy loss remains unexplained in approximately one-half of couples. Nevertheless, the chance of a live birth after three unexplained RPLs is over 50 percent with no intervention.     3/5/22- Beta 2 glycoprotein I Ab IgG elevated (22), Beta 2 glycoprotein I Ab IgA elevated (33) - see separate documentation    Recommendations:  Repeat testing 12 week after initial draw (around 24) with anticardiolipin antibody (IgG and IgM) titer, lupus anticoagulant, and beta-2 glycoprotein IgG and IgM titersAn inherited thrombophilia panel is not recommended for RPLEvaluation of thyroid function with TSH and for maternal diabetes with 2 hour glucose tolerance test or hemoglobin A1C (completed 3/5/24)        - Cervical insufficiency during pregnancy, antepartum  I reviewed the patient's history which is remarkable for delivery at 20.5 weeks gestation. A short cervix was noted at 20.3w, then, two days later, she PPROM'ed and delivered.  We reviewed the concept of cervical insufficiency and how it differs from  labor. We discussed recommendations for management  which include history indicated cervical cerclage placement. We reviewed the risks of cerclage procedure in detail. We discussed recommendations for pelvic rest, avoidance of moderate to high impact exercise, and no heavy lifting following cerclage placement. Progesterone supplementation is not indicated in absence of clinical concern for  labor signs or symptoms.     Patient was counseled on r/b/i/a of cerclage which include but are not limited to risk of anesthesia, pain, bleeding, infection, injury to adjacent structures, rupture of membranes, potential inability to place, and potential failure to prevent  delivery.  We plan to re-evaluate early fetal anatomical structure in 3-4 weeks as well as recommend NIPT testing. If all is normal, will plan for cervical cerclage placement at 12-14 weeks EGA.    24- Ultrasound evaluation reveals normal anatomical structure at this very early gestational age. She understands limitations, particularly with early ultrasound. Cerclage planned outpatient at [facility]  @ 7a    Recommendations:  [facility] to schedule outpatient cervical cerclage placement - 24 at 7a  Currently taking vaginal progesterone 200mg QHS   Follow-up with MFM 1-2 weeks after cerclage for post-operative exam  Monitor for signs of symptoms of  labor (LOF, vaginal bleeding, regular contractions) and signs of infection  To OB ED with concern for  labor or infection following cerclage placement  Remove cerclage at 36-37 weeks, unless indicated earlier    F/u cerclage in 2 weeks, CL US to follow at 16 weeks  F/u in 4 weeks for MFM visit /growth ultrasound    Nahomi Chun MD  Maternal Fetal Medicine

## 2024-06-12 NOTE — ASSESSMENT & PLAN NOTE
I reviewed the risks of APLS with the patient.  She is aware of the increased risk of venous thromboembolic disease and stroke.  We discussed the pregnancy risks which include prematurity, IUGR, preeclampsia, and miscarriage/stillbirth.   3/5/24- Beta 2 glycoprotein positive.     At this time, the diagnosis of APLS is unclear as her history of pregnancy loss is variable in etiology (1 partial molar pregnancy, 1 early SAB, 1 incompetent cx/sPTB at 20w) and she has only had one positive lab eval. Will continue Lovenox at this time and plan for repeat labs 12w after initial assessment. Further recs will be provided after repeat labs obtained.    6/12/24- She has [facility] to [facility] We are working on getting these records.  We discussed holding lovenox for cerclage and restarting that evening.

## 2024-06-17 NOTE — DISCHARGE INSTRUCTIONS
Patient Education       Cervical Cerclage Discharge Instructions     What care is needed at home?   You will need to rest in bed for a few days. Ask your doctor when you can go back to your normal daily activities.  You may have light vaginal bleeding and mild cramping. Bleeding should stop after a few days.  You may have some thick vaginal drainage which may last for the rest of the time you are pregnant.  Your doctor may ask you to not to have sex for at least 1 week.  What follow-up care is needed?   Be sure to keep your follow up visit.  Remind your doctor that you have had a cervical cerclage.  The stitches will be taken out by your doctor when the birth process starts.  What problems could happen?   Reaction to any drugs used during surgery  Premature labor  Your water breaks  Infection  Bleeding  Tearing of the cervix or womb ? stitches must be removed before a vaginal child birth  Damage to the cervix  The cervix does not dilate normally during labor  When do I need to call the doctor?   Signs of infection, such as a fever of 100.4°F (38°C) or higher, chills, pain with passing urine  Signs of labor like contractions or lower back pain  Belly pain or cramping  Water breaks or leaks  Vaginal bleeding  Upset stomach or throwing up

## 2024-06-23 ENCOUNTER — PATIENT MESSAGE (OUTPATIENT)
Dept: ADMINISTRATIVE | Facility: OTHER | Age: 24
End: 2024-06-23
Payer: MEDICAID

## 2024-06-25 ENCOUNTER — ANESTHESIA EVENT (OUTPATIENT)
Dept: SURGERY | Facility: HOSPITAL | Age: 24
End: 2024-06-25
Payer: MEDICAID

## 2024-06-25 ENCOUNTER — PATIENT MESSAGE (OUTPATIENT)
Dept: ADMINISTRATIVE | Facility: OTHER | Age: 24
End: 2024-06-25
Payer: MEDICAID

## 2024-06-26 ENCOUNTER — ANESTHESIA (OUTPATIENT)
Dept: SURGERY | Facility: HOSPITAL | Age: 24
End: 2024-06-26
Payer: MEDICAID

## 2024-06-26 ENCOUNTER — HOSPITAL ENCOUNTER (OUTPATIENT)
Facility: HOSPITAL | Age: 24
Discharge: HOME | End: 2024-06-26
Attending: OBSTETRICS & GYNECOLOGY | Admitting: OBSTETRICS & GYNECOLOGY
Payer: MEDICAID

## 2024-06-26 PROCEDURE — 25000003 PHARM REV CODE 250: Performed by: OBSTETRICS & GYNECOLOGY

## 2024-06-26 PROCEDURE — 36000706: Performed by: OBSTETRICS & GYNECOLOGY

## 2024-06-26 PROCEDURE — 37000008 HC ANESTHESIA 1ST 15 MINUTES: Performed by: OBSTETRICS & GYNECOLOGY

## 2024-06-26 PROCEDURE — 63600175 PHARM REV CODE 636 W HCPCS: Mod: JZ,JG | Performed by: STUDENT IN AN ORGANIZED HEALTH CARE EDUCATION/TRAINING PROGRAM

## 2024-06-26 PROCEDURE — 63600175 PHARM REV CODE 636 W HCPCS: Performed by: STUDENT IN AN ORGANIZED HEALTH CARE EDUCATION/TRAINING PROGRAM

## 2024-06-26 PROCEDURE — 71000016 HC POSTOP RECOV ADDL HR: Performed by: OBSTETRICS & GYNECOLOGY

## 2024-06-26 PROCEDURE — 36000707: Performed by: OBSTETRICS & GYNECOLOGY

## 2024-06-26 PROCEDURE — 37000009 HC ANESTHESIA EA ADD 15 MINS: Performed by: OBSTETRICS & GYNECOLOGY

## 2024-06-26 PROCEDURE — 25000003 PHARM REV CODE 250: Performed by: STUDENT IN AN ORGANIZED HEALTH CARE EDUCATION/TRAINING PROGRAM

## 2024-06-26 PROCEDURE — 71000033 HC RECOVERY, INTIAL HOUR: Performed by: OBSTETRICS & GYNECOLOGY

## 2024-06-26 PROCEDURE — 63600175 PHARM REV CODE 636 W HCPCS: Performed by: NURSE ANESTHETIST, CERTIFIED REGISTERED

## 2024-06-26 PROCEDURE — 71000015 HC POSTOP RECOV 1ST HR: Performed by: OBSTETRICS & GYNECOLOGY

## 2024-06-26 PROCEDURE — 59320 REVISION OF CERVIX: CPT | Mod: ,,, | Performed by: OBSTETRICS & GYNECOLOGY

## 2024-06-26 RX ORDER — SODIUM CITRATE AND CITRIC ACID MONOHYDRATE 334; 500 MG/5ML; MG/5ML
30 SOLUTION ORAL ONCE
Status: COMPLETED | OUTPATIENT
Start: 2024-06-26 | End: 2024-06-26

## 2024-06-26 RX ORDER — SODIUM CHLORIDE, SODIUM LACTATE, POTASSIUM CHLORIDE, CALCIUM CHLORIDE 600; 310; 30; 20 MG/100ML; MG/100ML; MG/100ML; MG/100ML
INJECTION, SOLUTION INTRAVENOUS CONTINUOUS PRN
Status: DISCONTINUED | OUTPATIENT
Start: 2024-06-26 | End: 2024-06-26

## 2024-06-26 RX ORDER — FENTANYL CITRATE 50 UG/ML
INJECTION, SOLUTION INTRAMUSCULAR; INTRAVENOUS
Status: DISCONTINUED | OUTPATIENT
Start: 2024-06-26 | End: 2024-06-26

## 2024-06-26 RX ORDER — SODIUM CHLORIDE, SODIUM LACTATE, POTASSIUM CHLORIDE, CALCIUM CHLORIDE 600; 310; 30; 20 MG/100ML; MG/100ML; MG/100ML; MG/100ML
INJECTION, SOLUTION INTRAVENOUS CONTINUOUS
Status: DISCONTINUED | OUTPATIENT
Start: 2024-06-26 | End: 2024-06-26 | Stop reason: HOSPADM

## 2024-06-26 RX ORDER — MIDAZOLAM HYDROCHLORIDE 1 MG/ML
INJECTION INTRAMUSCULAR; INTRAVENOUS
Status: DISCONTINUED
Start: 2024-06-26 | End: 2024-06-26 | Stop reason: WASHOUT

## 2024-06-26 RX ORDER — INDOMETHACIN 50 MG/1
50 CAPSULE ORAL ONCE
Status: COMPLETED | OUTPATIENT
Start: 2024-06-26 | End: 2024-06-26

## 2024-06-26 RX ORDER — SILVER NITRATE 38.21; 12.74 MG/1; MG/1
STICK TOPICAL
Status: DISCONTINUED
Start: 2024-06-26 | End: 2024-06-26 | Stop reason: WASHOUT

## 2024-06-26 RX ORDER — BUPIVACAINE HYDROCHLORIDE 7.5 MG/ML
INJECTION, SOLUTION EPIDURAL; RETROBULBAR
Status: COMPLETED | OUTPATIENT
Start: 2024-06-26 | End: 2024-06-26

## 2024-06-26 RX ADMIN — BUPIVACAINE HYDROCHLORIDE 1 ML: 7.5 INJECTION, SOLUTION EPIDURAL; RETROBULBAR at 07:06

## 2024-06-26 RX ADMIN — SODIUM CITRATE AND CITRIC ACID MONOHYDRATE 30 ML: 500; 334 SOLUTION ORAL at 06:06

## 2024-06-26 RX ADMIN — INDOMETHACIN 50 MG: 50 CAPSULE ORAL at 08:06

## 2024-06-26 RX ADMIN — FENTANYL CITRATE 20 MCG: 50 INJECTION, SOLUTION INTRAMUSCULAR; INTRAVENOUS at 07:06

## 2024-06-26 RX ADMIN — SODIUM CHLORIDE, POTASSIUM CHLORIDE, SODIUM LACTATE AND CALCIUM CHLORIDE: 600; 310; 30; 20 INJECTION, SOLUTION INTRAVENOUS at 08:06

## 2024-06-26 RX ADMIN — SODIUM CHLORIDE, POTASSIUM CHLORIDE, SODIUM LACTATE AND CALCIUM CHLORIDE: 600; 310; 30; 20 INJECTION, SOLUTION INTRAVENOUS at 07:06

## 2024-06-26 NOTE — OP NOTE
Operative Note       Procedure date: 06/26/2024      Surgeon(s) and Role:    Nahomi Chun MD - Primary    Pre-op Diagnosis:   1. Intrauterine pregnancy at 13w0d  2. Cervical incompetence  4. Recurrent pregnancy loss    Post-op Diagnosis:  Same    Procedure(s) (LRB):  History indicated Denson cerclage    Complications: none    UOP: 0cc    EBL: 50 cc    Anesthesia: Spinal    Findings/Key Components:  FHTs 150 prior to surgery. Denson cerclage placed without difficulty.     Procedure in Detail:    The patient was taken to the OR where spinal anesthesia was found to be adequate.  She was placed in the dorsal lithotomy position, then prepped and draped in the normal sterile fashion.  A weighted speculum was inserted into the posterior vagina and a right angle was used to visualize the cervix.  The anterior lip of the cervix was grasped with the ring forceps.  A 5mm Mersilene tape suture was placed at the 12 o'clock position on the cervix with careful attention to avoid the bladder.  The purse string stitch was continued around the cervix at the 9 o'clock, 6 o'clock, and 3 o'clock positions.  A loop of 2-0 prolene was placed under the left limb of the stitch in order to facilitate knot elevation for removal. The suture was tied at 12 o'clock with a long tail remaining.  The cervix was noted to be hemostatic, and the os was noted to be closed at the end of the procedure. The weighted speculum and ring forceps were removed.  The patient was taken out of the dorsal lithotomy position.  She tolerated the procedure well.  Instrument and lap counts were correct times two.  She was transferred to recovery in stable condition.      Disposition: PACU - hemodynamically stable.           Condition: Stable    Nahomi Chun MD          Attestation:  I was present and scrubbed for the entire procedure.

## 2024-06-26 NOTE — ANESTHESIA PROCEDURE NOTES
Spinal    Diagnosis: cervical incompetence  Patient location during procedure: OR  Start time: 6/26/2024 7:18 AM  Timeout: 6/26/2024 7:18 AM  End time: 6/26/2024 7:20 AM    Staffing  Authorizing Provider: Jose Cheema MD  Performing Provider: Jose Cheema MD    Staffing  Performed by: Jose Cheema MD  Authorized by: Jose Cheema MD    Preanesthetic Checklist  Completed: patient identified, IV checked, site marked, risks and benefits discussed, surgical consent, monitors and equipment checked, pre-op evaluation and timeout performed  Spinal Block  Patient position: sitting  Prep: ChloraPrep  Patient monitoring: heart rate  Approach: midline  Location: L3-4  Injection technique: single shot  CSF Fluid: clear free-flowing CSF  Needle  Needle type: Radha   Needle gauge: 25 G  Needle length: 3.5 in  Additional Documentation: no paresthesia on injection, incremental injection and negative aspiration for heme  Needle localization: anatomical landmarks  Assessment  Ease of block: easy  Patient's tolerance of the procedure: comfortable throughout block  Additional Notes  Straightforward SAB, one pass, + CSF egress, + aspiration pre and post injection, no complications, no paresthesias  + 20 mcg fentanyl   Reduced dose SAB to facilitate early discharge    Medications:    Medications: bupivacaine (pf) (MARCAINE) injection 0.75% - Intraspinal   1 mL - 6/26/2024 7:20:00 AM

## 2024-06-26 NOTE — TRANSFER OF CARE
"Anesthesia Transfer of Care Note    Patient: Latoya Gupta    Procedure(s) Performed: Procedure(s) (LRB):  CERCLAGE, CERVIX (N/A)    Patient location: OPS    Anesthesia Type: MAC    Transport from OR: Transported from OR on room air with adequate spontaneous ventilation    Post pain: adequate analgesia    Post assessment: no apparent anesthetic complications    Post vital signs: stable    Level of consciousness: awake    Nausea/Vomiting: no nausea/vomiting    Complications: none    Transfer of care protocol was followed      Last vitals: Visit Vitals  BP 91/60   Pulse 70   Temp 36.3 °C (97.3 °F)   Resp 15   Ht 5' 5" (1.651 m)   Wt 54.2 kg (119 lb 7.8 oz)   SpO2 99%   Breastfeeding No   BMI 19.88 kg/m²     "

## 2024-06-26 NOTE — DISCHARGE SUMMARY
[facility] Surgical - Periop Services  Discharge Note  Short Stay    Procedure(s) (LRB):  CERCLAGE, CERVIX (N/A)      OUTCOME: Patient tolerated treatment/procedure well without complication and is now ready for discharge.    DISPOSITION: Home or Self Care    FINAL DIAGNOSIS:  Cervical insufficiency    FOLLOWUP: In clinic    DISCHARGE INSTRUCTIONS: Continue vaginal progesterone nightly  No tub baths or anything in the vagina for the rest of pregnancy (No intercourse, tampons, etc)  No heavy lifting >20lbs  Some light bleeding today is normal  Call the office for any prolonged bleeding, leaking fluid, severe pain or any other issues.       TIME SPENT ON DISCHARGE: 5 minutes

## 2024-06-26 NOTE — ANESTHESIA PREPROCEDURE EVALUATION
06/26/2024  Latoya Gupta is a 24 y.o., female.      Historical labs ok  Lovenox ppx last dose Monday night (SUNG rec > 12h to neuraxial)    Pre-op Assessment    I have reviewed the Patient Summary Reports.     I have reviewed the Nursing Notes. I have reviewed the NPO Status.   I have reviewed the Medications.     Review of Systems  Anesthesia Hx:               Denies Personal Hx of Anesthesia complications.                    Cardiovascular:  Exercise tolerance: good                                           Pulmonary:  Pulmonary Normal                       Hepatic/GI:     GERD, well controlled             Musculoskeletal:  Musculoskeletal Normal                    Physical Exam  General: Well nourished, Cooperative and Alert    Airway:  Mallampati: II   Mouth Opening: Normal  TM Distance: Normal  Tongue: Normal    Dental:  Intact    Chest/Lungs:  Normal Respiratory Rate        Anesthesia Plan  Type of Anesthesia, risks & benefits discussed:    Anesthesia Type: Spinal  Intra-op Monitoring Plan: Standard ASA Monitors  Post Op Pain Control Plan: intrathecal opioid  (medical reason for not using multimodal pain management)  Induction:  IV  Informed Consent: Informed consent signed with the Patient and all parties understand the risks and agree with anesthesia plan.  All questions answered.   ASA Score: 2  Day of Surgery Review of History & Physical: H&P Update referred to the surgeon/provider.    Ready For Surgery From Anesthesia Perspective.     .

## 2024-06-26 NOTE — H&P
H&P Update:     I have seen and examined the patient. There are no new pertinent medical issues or concerns.   We have reviewed the risk of cerclage placement and benefits. She desires to proceed with placement of the cerclage. Her questions were answered.     FHTs obtained    Proceed to OR for cerclage placement.     Nahomi Chun

## 2024-06-27 ENCOUNTER — TELEPHONE (OUTPATIENT)
Dept: MATERNAL FETAL MEDICINE | Facility: CLINIC | Age: 24
End: 2024-06-27
Payer: MEDICAID

## 2024-06-27 VITALS
OXYGEN SATURATION: 99 % | RESPIRATION RATE: 18 BRPM | SYSTOLIC BLOOD PRESSURE: 104 MMHG | HEIGHT: 65 IN | TEMPERATURE: 97 F | HEART RATE: 76 BPM | DIASTOLIC BLOOD PRESSURE: 65 MMHG | WEIGHT: 119.5 LBS | BODY MASS INDEX: 19.91 KG/M2

## 2024-06-27 NOTE — TELEPHONE ENCOUNTER
Pt had a cerclage done on 6/26, per . Adiel wanted me to call pt and check in see how pt is doing.    I called pt, she reports she is doing well a lot better than what she expected, she reports the vaginal bleeding has stopped and only having mild cramping. I instructed pt if she needs anything please call our office, or if she is to start having heavy bleeding go to [facility] ER. Pt verbalized understanding.

## 2024-06-29 NOTE — ANESTHESIA POSTPROCEDURE EVALUATION
Anesthesia Post Evaluation    Patient: Latoya Gupta    Procedure(s) Performed: Procedure(s) (LRB):  CERCLAGE, CERVIX (N/A)    Final Anesthesia Type: spinal      Patient location during evaluation: PACU  Patient participation: Yes- Able to Participate  Level of consciousness: awake and alert  Post-procedure vital signs: reviewed and stable  Pain management: adequate  Airway patency: patent    PONV status at discharge: No PONV  Anesthetic complications: no      Respiratory status: unassisted  Hydration status: euvolemic  Follow-up not needed.          Patient moving b/l lower extremities    There was a concern in phase 2 regarding patient's inability to control bladder.  ? Whether this was amniotic fluid.  OB was called by nursing staff to evaluate patient.      Vitals Value Taken Time   /65 06/26/24 1234   Temp 36.3 °C (97.3 °F) 06/26/24 0801   Pulse 76 06/26/24 1234   Resp 18 06/26/24 0830   SpO2 99 % 06/26/24 1234         Event Time   Out of Recovery 08:30:00         Pain/Anjel Score: No data recorded

## 2024-06-30 ENCOUNTER — PATIENT MESSAGE (OUTPATIENT)
Dept: ADMINISTRATIVE | Facility: OTHER | Age: 24
End: 2024-06-30
Payer: MEDICAID

## 2024-07-02 DIAGNOSIS — O34.30 CERVICAL INSUFFICIENCY DURING PREGNANCY, ANTEPARTUM: ICD-10-CM

## 2024-07-02 DIAGNOSIS — D68.61 ANTIPHOSPHOLIPID SYNDROME DURING PREGNANCY: Primary | ICD-10-CM

## 2024-07-02 DIAGNOSIS — O99.119 ANTIPHOSPHOLIPID SYNDROME DURING PREGNANCY: Primary | ICD-10-CM

## 2024-07-02 DIAGNOSIS — E04.1 THYROID NODULE: ICD-10-CM

## 2024-07-03 ENCOUNTER — PROCEDURE VISIT (OUTPATIENT)
Dept: MATERNAL FETAL MEDICINE | Facility: CLINIC | Age: 24
End: 2024-07-03
Payer: MEDICAID

## 2024-07-03 ENCOUNTER — OFFICE VISIT (OUTPATIENT)
Dept: MATERNAL FETAL MEDICINE | Facility: CLINIC | Age: 24
End: 2024-07-03
Payer: MEDICAID

## 2024-07-03 VITALS
HEIGHT: 65 IN | BODY MASS INDEX: 19.87 KG/M2 | DIASTOLIC BLOOD PRESSURE: 71 MMHG | HEART RATE: 105 BPM | WEIGHT: 119.25 LBS | SYSTOLIC BLOOD PRESSURE: 108 MMHG

## 2024-07-03 DIAGNOSIS — O99.119 ANTIPHOSPHOLIPID SYNDROME DURING PREGNANCY: ICD-10-CM

## 2024-07-03 DIAGNOSIS — O34.30 CERVICAL INSUFFICIENCY DURING PREGNANCY, ANTEPARTUM: ICD-10-CM

## 2024-07-03 DIAGNOSIS — E04.1 THYROID NODULE: ICD-10-CM

## 2024-07-03 DIAGNOSIS — D68.61 ANTIPHOSPHOLIPID SYNDROME DURING PREGNANCY: ICD-10-CM

## 2024-07-03 DIAGNOSIS — O26.20 RECURRENT PREGNANCY LOSS, CURRENTLY PREGNANT: ICD-10-CM

## 2024-07-03 DIAGNOSIS — D68.61 ANTIPHOSPHOLIPID SYNDROME DURING PREGNANCY: Primary | ICD-10-CM

## 2024-07-03 DIAGNOSIS — O99.119 ANTIPHOSPHOLIPID SYNDROME DURING PREGNANCY: Primary | ICD-10-CM

## 2024-07-03 NOTE — PROGRESS NOTES
"Maternal Fetal Medicine follow up consult    SUBJECTIVE:     Latoya Gupta is a 24 y.o.  female with IUP at 14w0d who is seen in follow up consultation by MFM.  Pregnancy complications include:   Problem   - Cervical insufficiency during pregnancy, antepartum   - Recurrent pregnancy loss, currently pregnant   - Antiphospholipid syndrome during pregnancy   - Thyroid nodule     Latoya presents for routine follow up appointment.  S/p cerclage. Continues to use vaginal progesterone QHS. Doing well.  Denies any problems with Lovenox injections.  Denies LOF, VB, contractions. Positive fetal movement.    Previous notes reviewed.   No changes to medical, surgical, family, social, or obstetric history.  Interval history since last MFM visit: see above  Medications reviewed.    Care team members:  Dr Caraballo - Primary OB     OBJECTIVE:   /71 (BP Location: Right arm)   Pulse 105   Ht 5' 5" (1.651 m)   Wt 54.1 kg (119 lb 4.3 oz)   BMI 19.85 kg/m²     Ultrasound performed. See viewpoint for full ultrasound report.    A wolf living IUP is identified, and the biometry is appropriate for established gestational age.    Early, limited anatomy appears normal. The placenta is anterior.  Transvaginal cervical length was 3.2 cm with cerclage in place.    ASSESSMENT/PLAN:     24 y.o.  female with IUP at 14w0d    - Antiphospholipid syndrome during pregnancy  I reviewed the risks of APLS with the patient.  She is aware of the increased risk of venous thromboembolic disease and stroke.  We discussed the pregnancy risks which include prematurity, IUGR, preeclampsia, and miscarriage/stillbirth.   3/5/24- Beta 2 glycoprotein positive.     At this time, the diagnosis of APLS is unclear as her history of pregnancy loss is variable in etiology (1 partial molar pregnancy, 1 early SAB, 1 incompetent cx/sPTB at 20w) and she has only had one positive lab eval. Will continue Lovenox at this time and plan for repeat labs 12w " after initial assessment. Further recs will be provided after repeat labs obtained.    6/12/24- She has an order to repeat APLS labs and she says she did them at [facility]. We are working on getting these records.  We discussed holding lovenox for cerclage and restarting that evening.     7/3/24- S/p cerclage. Resumed lovenox - going well.    - Thyroid nodule  She reports a history of a thyroid nodule. Her mother had thyroid cancer. She states she had a consult with an endocrinologist. However, he moved out of the country and she did not have a follow up appointment with him.   3/5/24- TSH 1.5  Now that she's pregnant, recommend repeat TFT eval at end of the first trimester.    7/3/24- TFT order provided    - Recurrent pregnancy loss, currently pregnant  She's had one partial molar pregnancy @ 13w, one early SAB, and one previable sPTB at 20w d/t cervical insufficiency.    Miscarriage is the most common complication of early pregnancy. An estimated 8-20% of clinically recognized pregnancies less than 20 weeks of gestation will miscarry. 80% of miscarriages happen in the first 12 weeks of gestation. Chromosomal abnormalities account for approximately 50% of all miscarriages and the earlier the gestational age at miscarriage the higher the incidence. Other etiologies for miscarriage include congenital anomalies, maternal uterine anomalies, poorly controlled thyroid disease or diabetes, and acute maternal infection. Some miscarriages are unexplained. Recurrent pregnancy loss (RPL) refers to three or more consecutive losses of clinically recognized pregnancies prior to 20 weeks of gestation. While approximately 15% of women experience 1 miscarriage, only 2% experience 2 consecutive losses and less than 1% experience 3 consecutive losses.   In women with a history of two or more prior losses, the chance of a viable birth after ultrasound  detection of fetal cardiac activity is approximately 77%. ?  After evaluation,  recurrent pregnancy loss remains unexplained in approximately one-half of couples. Nevertheless, the chance of a live birth after three unexplained RPLs is over 50 percent with no intervention.     3/5/22- Beta 2 glycoprotein I Ab IgG elevated (22), Beta 2 glycoprotein I Ab IgA elevated (33) - see separate documentation    Recommendations:  Repeat testing 12 week after initial draw (around 24) with anticardiolipin antibody (IgG and IgM) titer, lupus anticoagulant, and beta-2 glycoprotein IgG and IgM titersAn inherited thrombophilia panel is not recommended for RPLEvaluation of thyroid function with TSH and for maternal diabetes with 2 hour glucose tolerance test or hemoglobin A1C (completed 3/5/24)        - Cervical insufficiency during pregnancy, antepartum  I reviewed the patient's history which is remarkable for delivery at 20.5 weeks gestation. A short cervix was noted at 20.3w, then, two days later, she PPROM'ed and delivered.  We reviewed the concept of cervical insufficiency and how it differs from  labor. We discussed recommendations for management which include history indicated cervical cerclage placement. We reviewed the risks of cerclage procedure in detail. We discussed recommendations for pelvic rest, avoidance of moderate to high impact exercise, and no heavy lifting following cerclage placement. Progesterone supplementation is not indicated in absence of clinical concern for  labor signs or symptoms.     Patient was counseled on r/b/i/a of cerclage which include but are not limited to risk of anesthesia, pain, bleeding, infection, injury to adjacent structures, rupture of membranes, potential inability to place, and potential failure to prevent  delivery.  We plan to re-evaluate early fetal anatomical structure in 3-4 weeks as well as recommend NIPT testing. If all is normal, will plan for cervical cerclage placement at 12-14 weeks EGA.    24- Ultrasound evaluation  reveals normal anatomical structure at this very early gestational age. She understands limitations, particularly with early ultrasound. Cerclage planned outpatient at [facility]  @ 7a  7/3/24- s/p cerclage placement. Doing well. Continuing vaginal progesterone. TVU-CL normal with cerclage noted to be tightly in place.    Recommendations:  MF to schedule outpatient cervical cerclage placement - 24 at 7a  Currently taking vaginal progesterone 200mg QHS. Continue until 37w.  Follow-up with MFM 1-2 weeks after cerclage for post-operative exam  Monitor for signs of symptoms of  labor (LOF, vaginal bleeding, regular contractions) and signs of infection  To OB ED with concern for  labor or infection following cerclage placement  Remove cerclage at 36-37 weeks, unless indicated earlier      F/u in 2 weeks for cervical length only  F/u in 4 weeks for MFM visit/growth ultrasound    Nahomi Chun MD  Maternal Fetal Medicine

## 2024-07-03 NOTE — ASSESSMENT & PLAN NOTE
I reviewed the patient's history which is remarkable for delivery at 20.5 weeks gestation. A short cervix was noted at 20.3w, then, two days later, she PPROM'ed and delivered.  We reviewed the concept of cervical insufficiency and how it differs from  labor. We discussed recommendations for management which include history indicated cervical cerclage placement. We reviewed the risks of cerclage procedure in detail. We discussed recommendations for pelvic rest, avoidance of moderate to high impact exercise, and no heavy lifting following cerclage placement. Progesterone supplementation is not indicated in absence of clinical concern for  labor signs or symptoms.     Patient was counseled on r/b/i/a of cerclage which include but are not limited to risk of anesthesia, pain, bleeding, infection, injury to adjacent structures, rupture of membranes, potential inability to place, and potential failure to prevent  delivery.  We plan to re-evaluate early fetal anatomical structure in 3-4 weeks as well as recommend NIPT testing. If all is normal, will plan for cervical cerclage placement at 12-14 weeks EGA.    24- Ultrasound evaluation reveals normal anatomical structure at this very early gestational age. She understands limitations, particularly with early ultrasound. Cerclage planned outpatient at [facility]  @ 7a  7/3/24- s/p cerclage placement. Doing well. Continuing vaginal progesterone. TVU-CL normal with cerclage noted to be tightly in place.    Recommendations:  [facility] to schedule outpatient cervical cerclage placement - 24 at 7a  Currently taking vaginal progesterone 200mg QHS. Continue until 37w.  Follow-up with MFM 1-2 weeks after cerclage for post-operative exam  Monitor for signs of symptoms of  labor (LOF, vaginal bleeding, regular contractions) and signs of infection  To OB ED with concern for  labor or infection following cerclage placement  Remove cerclage at 36-37  weeks, unless indicated earlier

## 2024-07-03 NOTE — ASSESSMENT & PLAN NOTE
She reports a history of a thyroid nodule. Her mother had thyroid cancer. She states she had a consult with an endocrinologist. However, he moved out of the country and she did not have a follow up appointment with him.   3/5/24- TSH 1.5  Now that she's pregnant, recommend repeat TFT eval at end of the first trimester.    7/3/24- TFT order provided

## 2024-07-10 ENCOUNTER — LAB VISIT (OUTPATIENT)
Dept: LAB | Facility: HOSPITAL | Age: 24
End: 2024-07-10
Attending: NURSE PRACTITIONER
Payer: MEDICAID

## 2024-07-10 DIAGNOSIS — E04.1 THYROID NODULE: ICD-10-CM

## 2024-07-10 LAB
T4 FREE SERPL-MCNC: 0.86 NG/DL (ref 0.7–1.48)
TSH SERPL-ACNC: 0.98 UIU/ML (ref 0.35–4.94)

## 2024-07-10 PROCEDURE — 84443 ASSAY THYROID STIM HORMONE: CPT

## 2024-07-10 PROCEDURE — 84439 ASSAY OF FREE THYROXINE: CPT

## 2024-07-10 PROCEDURE — 36415 COLL VENOUS BLD VENIPUNCTURE: CPT

## 2024-07-17 ENCOUNTER — PROCEDURE VISIT (OUTPATIENT)
Dept: MATERNAL FETAL MEDICINE | Facility: CLINIC | Age: 24
End: 2024-07-17
Payer: MEDICAID

## 2024-07-17 DIAGNOSIS — O99.119 ANTIPHOSPHOLIPID SYNDROME DURING PREGNANCY: ICD-10-CM

## 2024-07-17 DIAGNOSIS — E04.1 THYROID NODULE: ICD-10-CM

## 2024-07-17 DIAGNOSIS — D68.61 ANTIPHOSPHOLIPID SYNDROME DURING PREGNANCY: ICD-10-CM

## 2024-07-17 DIAGNOSIS — O34.30 CERVICAL INSUFFICIENCY DURING PREGNANCY, ANTEPARTUM: ICD-10-CM

## 2024-07-17 PROCEDURE — 76817 TRANSVAGINAL US OBSTETRIC: CPT | Mod: S$GLB,,, | Performed by: OBSTETRICS & GYNECOLOGY

## 2024-07-30 DIAGNOSIS — D68.61 ANTIPHOSPHOLIPID SYNDROME DURING PREGNANCY: Primary | ICD-10-CM

## 2024-07-30 DIAGNOSIS — O99.119 ANTIPHOSPHOLIPID SYNDROME DURING PREGNANCY: Primary | ICD-10-CM

## 2024-07-31 ENCOUNTER — OFFICE VISIT (OUTPATIENT)
Dept: MATERNAL FETAL MEDICINE | Facility: CLINIC | Age: 24
End: 2024-07-31
Payer: MEDICAID

## 2024-07-31 ENCOUNTER — PROCEDURE VISIT (OUTPATIENT)
Dept: MATERNAL FETAL MEDICINE | Facility: CLINIC | Age: 24
End: 2024-07-31
Payer: MEDICAID

## 2024-07-31 VITALS
HEIGHT: 65 IN | BODY MASS INDEX: 21.14 KG/M2 | WEIGHT: 126.88 LBS | RESPIRATION RATE: 20 BRPM | SYSTOLIC BLOOD PRESSURE: 111 MMHG | DIASTOLIC BLOOD PRESSURE: 72 MMHG | HEART RATE: 93 BPM

## 2024-07-31 DIAGNOSIS — O34.30 CERVICAL INSUFFICIENCY DURING PREGNANCY, ANTEPARTUM: ICD-10-CM

## 2024-07-31 DIAGNOSIS — D68.61 ANTIPHOSPHOLIPID SYNDROME DURING PREGNANCY: Primary | ICD-10-CM

## 2024-07-31 DIAGNOSIS — O99.119 ANTIPHOSPHOLIPID SYNDROME DURING PREGNANCY: Primary | ICD-10-CM

## 2024-07-31 DIAGNOSIS — O26.20 RECURRENT PREGNANCY LOSS, CURRENTLY PREGNANT: ICD-10-CM

## 2024-07-31 DIAGNOSIS — E04.1 THYROID NODULE: ICD-10-CM

## 2024-07-31 DIAGNOSIS — O99.119 ANTIPHOSPHOLIPID SYNDROME DURING PREGNANCY: ICD-10-CM

## 2024-07-31 DIAGNOSIS — D68.61 ANTIPHOSPHOLIPID SYNDROME DURING PREGNANCY: ICD-10-CM

## 2024-07-31 NOTE — PROGRESS NOTES
"Maternal Fetal Medicine follow up consult    SUBJECTIVE:     Latoya Gupta is a 24 y.o.  female with IUP at 18w0d who is seen in follow up consultation by MFM.  Pregnancy complications include:   Problem   - Cervical insufficiency during pregnancy, antepartum   - Recurrent pregnancy loss, currently pregnant   - Antiphospholipid syndrome during pregnancy   - Thyroid nodule     Latoya presents for routine follow up appointment.  Denies LOF, VB, contractions. Positive fetal movement.  She is doing well with her lovenox injections.     Previous notes reviewed.   No changes to medical, surgical, family, social, or obstetric history.  Interval history since last MFM visit: see above  Medications reviewed.    Care team members:  Dr Caraballo - Primary OB     OBJECTIVE:   /72   Pulse 93   Resp 20   Ht 5' 5" (1.651 m)   Wt 57.6 kg (126 lb 14 oz)   BMI 21.11 kg/m²     Ultrasound performed. See viewpoint for full ultrasound report.    A detailed fetal anatomic ultrasound examination was performed today due to the following high risk indication: APLS.   No fetal structural abnormalities are identified today, and fetal size is appropriate for EGA.   Amniotic fluid volume is normal.  Transvaginal cervical length measures 3.2 cm with cerclage in place.  Placental location is anterior without evidence of previa.     ASSESSMENT/PLAN:     24 y.o.  female with IUP at 18w0d    - Antiphospholipid syndrome during pregnancy  I reviewed the risks of APLS with the patient.  She is aware of the increased risk of venous thromboembolic disease and stroke.  We discussed the pregnancy risks which include prematurity, IUGR, preeclampsia, and miscarriage/stillbirth.   3/5/24- Beta 2 glycoprotein positive.     At this time, the diagnosis of APLS is unclear as her history of pregnancy loss is variable in etiology (1 partial molar pregnancy, 1 early SAB, 1 incompetent cx/sPTB at 20w) and she has only had one positive lab eval. " Will continue Lovenox at this time and plan for repeat labs 12w after initial assessment. Further recs will be provided after repeat labs obtained.    6/12/24- She has an order to [facility] We are working on getting these records.  We discussed holding lovenox for cerclage and restarting that evening.     7/3/24- S/p cerclage. Resumed lovenox - going well.    7/31/24- Doing well. No changes in regimen today.    - Thyroid nodule  She reports a history of a thyroid nodule. Her mother had thyroid cancer. She states she had a consult with an endocrinologist. However, he moved out of the country and she did not have a follow up appointment with him.   3/5/24- TSH 1.5  Now that she's pregnant, recommend repeat TFT eval at end of the first trimester.    7/3/24- TFT order provided    7/31/24- TFTs completed on 7/10- normal    - Recurrent pregnancy loss, currently pregnant  She's had one partial molar pregnancy @ 13w, one early SAB, and one previable sPTB at 20w d/t cervical insufficiency.    Miscarriage is the most common complication of early pregnancy. An estimated 8-20% of clinically recognized pregnancies less than 20 weeks of gestation will miscarry. 80% of miscarriages happen in the first 12 weeks of gestation. Chromosomal abnormalities account for approximately 50% of all miscarriages and the earlier the gestational age at miscarriage the higher the incidence. Other etiologies for miscarriage include congenital anomalies, maternal uterine anomalies, poorly controlled thyroid disease or diabetes, and acute maternal infection. Some miscarriages are unexplained. Recurrent pregnancy loss (RPL) refers to three or more consecutive losses of clinically recognized pregnancies prior to 20 weeks of gestation. While approximately 15% of women experience 1 miscarriage, only 2% experience 2 consecutive losses and less than 1% experience 3 consecutive losses.   In women with a history  of two or more prior losses, the chance of a viable birth after ultrasound  detection of fetal cardiac activity is approximately 77%. ?  After evaluation, recurrent pregnancy loss remains unexplained in approximately one-half of couples. Nevertheless, the chance of a live birth after three unexplained RPLs is over 50 percent with no intervention.     3/5/22- Beta 2 glycoprotein I Ab IgG elevated (22), Beta 2 glycoprotein I Ab IgA elevated (33) - see separate documentation    Repeat APLS testing completed on 6/10/24- persistently elevated beta 2 glycoprotein    Recommendations:  Repeat testing 12 week after initial draw (around 24) with anticardiolipin antibody (IgG and IgM) titer, lupus anticoagulant, and beta-2 glycoprotein IgG and IgM titers - completed as noted aboveAn inherited thrombophilia panel is not recommended for RPLEvaluation of thyroid function with TSH and for maternal diabetes with 2 hour glucose tolerance test or hemoglobin A1C (completed 3/5/24)        - Cervical insufficiency during pregnancy, antepartum  I reviewed the patient's history which is remarkable for delivery at 20.5 weeks gestation. A short cervix was noted at 20.3w, then, two days later, she PPROM'ed and delivered.  We reviewed the concept of cervical insufficiency and how it differs from  labor. We discussed recommendations for management which include history indicated cervical cerclage placement. We reviewed the risks of cerclage procedure in detail. We discussed recommendations for pelvic rest, avoidance of moderate to high impact exercise, and no heavy lifting following cerclage placement. Progesterone supplementation is not indicated in absence of clinical concern for  labor signs or symptoms.     Patient was counseled on r/b/i/a of cerclage which include but are not limited to risk of anesthesia, pain, bleeding, infection, injury to adjacent structures, rupture of membranes, potential inability to place, and  potential failure to prevent  delivery.  We plan to re-evaluate early fetal anatomical structure in 3-4 weeks as well as recommend NIPT testing. If all is normal, will plan for cervical cerclage placement at 12-14 weeks EGA.    24- Ultrasound evaluation reveals normal anatomical structure at this very early gestational age. She understands limitations, particularly with early ultrasound. Cerclage planned outpatient at [facility]  @ 7a  7/3/24- s/p cerclage placement. Doing well. Continuing vaginal progesterone. TVU-CL normal with cerclage noted to be tightly in place.  24- TVU CL normal    Recommendations:  MFM to schedule outpatient cervical cerclage placement - 24 at 7a  Currently taking vaginal progesterone 200mg QHS. Continue until 37w.  Follow-up with MFM 1-2 weeks after cerclage for post-operative exam  Monitor for signs of symptoms of  labor (LOF, vaginal bleeding, regular contractions) and signs of infection  To OB ED with concern for  labor or infection following cerclage placement  Remove cerclage at 36-37 weeks, unless indicated earlier    F/u in 2 weeks for cervical length only  F/u in 4 weeks for MFM visit    Nahomi Chun MD  Maternal Fetal Medicine

## 2024-07-31 NOTE — ASSESSMENT & PLAN NOTE
She's had one partial molar pregnancy @ 13w, one early SAB, and one previable sPTB at 20w d/t cervical insufficiency.    Miscarriage is the most common complication of early pregnancy. An estimated 8-20% of clinically recognized pregnancies less than 20 weeks of gestation will miscarry. 80% of miscarriages happen in the first 12 weeks of gestation. Chromosomal abnormalities account for approximately 50% of all miscarriages and the earlier the gestational age at miscarriage the higher the incidence. Other etiologies for miscarriage include congenital anomalies, maternal uterine anomalies, poorly controlled thyroid disease or diabetes, and acute maternal infection. Some miscarriages are unexplained. Recurrent pregnancy loss (RPL) refers to three or more consecutive losses of clinically recognized pregnancies prior to 20 weeks of gestation. While approximately 15% of women experience 1 miscarriage, only 2% experience 2 consecutive losses and less than 1% experience 3 consecutive losses.   In women with a history of two or more prior losses, the chance of a viable birth after ultrasound  detection of fetal cardiac activity is approximately 77%. ?  After evaluation, recurrent pregnancy loss remains unexplained in approximately one-half of couples. Nevertheless, the chance of a live birth after three unexplained RPLs is over 50 percent with no intervention.     3/5/22- Beta 2 glycoprotein I Ab IgG elevated (22), Beta 2 glycoprotein I Ab IgA elevated (33) - see separate documentation    Repeat APLS testing completed on 6/10/24- persistently elevated beta 2 glycoprotein    Recommendations:  Repeat testing 12 week after initial draw (around 6/5/24) with anticardiolipin antibody (IgG and IgM) titer, lupus anticoagulant, and beta-2 glycoprotein IgG and IgM titers - completed as noted aboveAn inherited thrombophilia panel is not recommended for RPLEvaluation of thyroid function with TSH and for maternal diabetes with 2 hour  glucose tolerance test or hemoglobin A1C (completed 3/5/24)

## 2024-07-31 NOTE — ASSESSMENT & PLAN NOTE
She reports a history of a thyroid nodule. Her mother had thyroid cancer. She states she had a consult with an endocrinologist. However, he moved out of the country and she did not have a follow up appointment with him.   3/5/24- TSH 1.5  Now that she's pregnant, recommend repeat TFT eval at end of the first trimester.    7/3/24- TFT order provided    7/31/24- TFTs completed on 7/10- normal

## 2024-07-31 NOTE — ASSESSMENT & PLAN NOTE
I reviewed the patient's history which is remarkable for delivery at 20.5 weeks gestation. A short cervix was noted at 20.3w, then, two days later, she PPROM'ed and delivered.  We reviewed the concept of cervical insufficiency and how it differs from  labor. We discussed recommendations for management which include history indicated cervical cerclage placement. We reviewed the risks of cerclage procedure in detail. We discussed recommendations for pelvic rest, avoidance of moderate to high impact exercise, and no heavy lifting following cerclage placement. Progesterone supplementation is not indicated in absence of clinical concern for  labor signs or symptoms.     Patient was counseled on r/b/i/a of cerclage which include but are not limited to risk of anesthesia, pain, bleeding, infection, injury to adjacent structures, rupture of membranes, potential inability to place, and potential failure to prevent  delivery.  We plan to re-evaluate early fetal anatomical structure in 3-4 weeks as well as recommend NIPT testing. If all is normal, will plan for cervical cerclage placement at 12-14 weeks EGA.    24- Ultrasound evaluation reveals normal anatomical structure at this very early gestational age. She understands limitations, particularly with early ultrasound. Cerclage planned outpatient at [facility]  @ 7a  7/3/24- s/p cerclage placement. Doing well. Continuing vaginal progesterone. TVU-CL normal with cerclage noted to be tightly in place.  24- TVU CL normal    Recommendations:  MF to schedule outpatient cervical cerclage placement - 24 at 7a  Currently taking vaginal progesterone 200mg QHS. Continue until 37w.  Follow-up with MFM 1-2 weeks after cerclage for post-operative exam  Monitor for signs of symptoms of  labor (LOF, vaginal bleeding, regular contractions) and signs of infection  To OB ED with concern for  labor or infection following cerclage  placement  Remove cerclage at 36-37 weeks, unless indicated earlier

## 2024-07-31 NOTE — ASSESSMENT & PLAN NOTE
I reviewed the risks of APLS with the patient.  She is aware of the increased risk of venous thromboembolic disease and stroke.  We discussed the pregnancy risks which include prematurity, IUGR, preeclampsia, and miscarriage/stillbirth.   3/5/24- Beta 2 glycoprotein positive.     At this time, the diagnosis of APLS is unclear as her history of pregnancy loss is variable in etiology (1 partial molar pregnancy, 1 early SAB, 1 incompetent cx/sPTB at 20w) and she has only had one positive lab eval. Will continue Lovenox at this time and plan for repeat labs 12w after initial assessment. Further recs will be provided after repeat labs obtained.    6/12/24- She has an order to repeat [facility] labs [facility] We are working on getting these records.  We discussed holding lovenox for cerclage and restarting that evening.     7/3/24- S/p cerclage. Resumed lovenox - going well.    7/31/24- Doing well. No changes in regimen today.

## 2024-08-05 ENCOUNTER — OFFICE VISIT (OUTPATIENT)
Dept: MATERNAL FETAL MEDICINE | Facility: CLINIC | Age: 24
End: 2024-08-05
Payer: MEDICAID

## 2024-08-05 ENCOUNTER — PROCEDURE VISIT (OUTPATIENT)
Dept: MATERNAL FETAL MEDICINE | Facility: CLINIC | Age: 24
End: 2024-08-05
Payer: MEDICAID

## 2024-08-05 VITALS
WEIGHT: 126.75 LBS | SYSTOLIC BLOOD PRESSURE: 106 MMHG | BODY MASS INDEX: 21.12 KG/M2 | HEIGHT: 65 IN | DIASTOLIC BLOOD PRESSURE: 69 MMHG | HEART RATE: 88 BPM

## 2024-08-05 DIAGNOSIS — O34.30 CERVICAL INSUFFICIENCY DURING PREGNANCY, ANTEPARTUM: ICD-10-CM

## 2024-08-05 DIAGNOSIS — D68.61 ANTIPHOSPHOLIPID SYNDROME DURING PREGNANCY: ICD-10-CM

## 2024-08-05 DIAGNOSIS — O99.119 ANTIPHOSPHOLIPID SYNDROME DURING PREGNANCY: ICD-10-CM

## 2024-08-05 DIAGNOSIS — O26.20 RECURRENT PREGNANCY LOSS, CURRENTLY PREGNANT: ICD-10-CM

## 2024-08-05 DIAGNOSIS — D68.61 ANTIPHOSPHOLIPID SYNDROME DURING PREGNANCY: Primary | ICD-10-CM

## 2024-08-05 DIAGNOSIS — O99.119 ANTIPHOSPHOLIPID SYNDROME DURING PREGNANCY: Primary | ICD-10-CM

## 2024-08-05 PROCEDURE — 3078F DIAST BP <80 MM HG: CPT | Mod: CPTII,,, | Performed by: OBSTETRICS & GYNECOLOGY

## 2024-08-05 PROCEDURE — 3008F BODY MASS INDEX DOCD: CPT | Mod: CPTII,,, | Performed by: OBSTETRICS & GYNECOLOGY

## 2024-08-05 PROCEDURE — 1159F MED LIST DOCD IN RCRD: CPT | Mod: CPTII,,, | Performed by: OBSTETRICS & GYNECOLOGY

## 2024-08-05 PROCEDURE — 76817 TRANSVAGINAL US OBSTETRIC: CPT | Mod: ,,, | Performed by: OBSTETRICS & GYNECOLOGY

## 2024-08-05 PROCEDURE — 3074F SYST BP LT 130 MM HG: CPT | Mod: CPTII,,, | Performed by: OBSTETRICS & GYNECOLOGY

## 2024-08-05 PROCEDURE — 1160F RVW MEDS BY RX/DR IN RCRD: CPT | Mod: CPTII,,, | Performed by: OBSTETRICS & GYNECOLOGY

## 2024-08-05 PROCEDURE — 99213 OFFICE O/P EST LOW 20 MIN: CPT | Mod: TH,,, | Performed by: OBSTETRICS & GYNECOLOGY

## 2024-08-12 DIAGNOSIS — O34.30 CERVICAL INSUFFICIENCY DURING PREGNANCY, ANTEPARTUM: ICD-10-CM

## 2024-08-12 DIAGNOSIS — D68.61 ANTIPHOSPHOLIPID SYNDROME DURING PREGNANCY: Primary | ICD-10-CM

## 2024-08-12 DIAGNOSIS — O99.119 ANTIPHOSPHOLIPID SYNDROME DURING PREGNANCY: Primary | ICD-10-CM

## 2024-08-12 DIAGNOSIS — O26.20 RECURRENT PREGNANCY LOSS, CURRENTLY PREGNANT: ICD-10-CM

## 2024-08-14 ENCOUNTER — PROCEDURE VISIT (OUTPATIENT)
Dept: MATERNAL FETAL MEDICINE | Facility: CLINIC | Age: 24
End: 2024-08-14
Payer: MEDICAID

## 2024-08-14 DIAGNOSIS — D68.61 ANTIPHOSPHOLIPID SYNDROME DURING PREGNANCY: ICD-10-CM

## 2024-08-14 DIAGNOSIS — O26.20 RECURRENT PREGNANCY LOSS, CURRENTLY PREGNANT: ICD-10-CM

## 2024-08-14 DIAGNOSIS — O99.119 ANTIPHOSPHOLIPID SYNDROME DURING PREGNANCY: ICD-10-CM

## 2024-08-14 DIAGNOSIS — O34.30 CERVICAL INSUFFICIENCY DURING PREGNANCY, ANTEPARTUM: ICD-10-CM

## 2024-08-14 PROCEDURE — 76817 TRANSVAGINAL US OBSTETRIC: CPT | Mod: S$GLB,,, | Performed by: OBSTETRICS & GYNECOLOGY

## 2024-08-28 ENCOUNTER — PROCEDURE VISIT (OUTPATIENT)
Dept: MATERNAL FETAL MEDICINE | Facility: CLINIC | Age: 24
End: 2024-08-28
Payer: MEDICAID

## 2024-08-28 ENCOUNTER — OFFICE VISIT (OUTPATIENT)
Dept: MATERNAL FETAL MEDICINE | Facility: CLINIC | Age: 24
End: 2024-08-28
Payer: MEDICAID

## 2024-08-28 VITALS
HEART RATE: 90 BPM | HEIGHT: 65 IN | WEIGHT: 132.69 LBS | BODY MASS INDEX: 22.11 KG/M2 | SYSTOLIC BLOOD PRESSURE: 117 MMHG | DIASTOLIC BLOOD PRESSURE: 73 MMHG

## 2024-08-28 DIAGNOSIS — O99.119 ANTIPHOSPHOLIPID SYNDROME DURING PREGNANCY: Primary | ICD-10-CM

## 2024-08-28 DIAGNOSIS — D68.61 ANTIPHOSPHOLIPID SYNDROME DURING PREGNANCY: ICD-10-CM

## 2024-08-28 DIAGNOSIS — O99.119 ANTIPHOSPHOLIPID SYNDROME DURING PREGNANCY: ICD-10-CM

## 2024-08-28 DIAGNOSIS — O34.30 CERVICAL INSUFFICIENCY DURING PREGNANCY, ANTEPARTUM: ICD-10-CM

## 2024-08-28 DIAGNOSIS — O26.20 RECURRENT PREGNANCY LOSS, CURRENTLY PREGNANT: ICD-10-CM

## 2024-08-28 DIAGNOSIS — D68.61 ANTIPHOSPHOLIPID SYNDROME DURING PREGNANCY: Primary | ICD-10-CM

## 2024-08-28 NOTE — ASSESSMENT & PLAN NOTE
I reviewed the risks of APLS with the patient.  She is aware of the increased risk of venous thromboembolic disease and stroke.  We discussed the pregnancy risks which include prematurity, IUGR, preeclampsia, and miscarriage/stillbirth.   3/5/24- Beta 2 glycoprotein positive.     At this time, the diagnosis of APLS is unclear as her history of pregnancy loss is variable in etiology (1 partial molar pregnancy, 1 early SAB, 1 incompetent cx/sPTB at 20w) and she has only had one positive lab eval. Will continue Lovenox at this time and plan for repeat labs 12w after initial assessment. Further recs will be provided after repeat labs obtained.    6/12/24- She has an order to repeat [facility] We are working on getting these records.  We discussed holding lovenox for cerclage and restarting that evening.     7/3/24- S/p cerclage. Resumed lovenox - going well.    7/31/24- Doing well. No changes in regimen today.    8/28- No more spotting. No changes

## 2024-08-28 NOTE — ASSESSMENT & PLAN NOTE
I reviewed the patient's history which is remarkable for delivery at 20.5 weeks gestation. A short cervix was noted at 20.3w, then, two days later, she PPROM'ed and delivered.  We reviewed the concept of cervical insufficiency and how it differs from  labor. We discussed recommendations for management which include history indicated cervical cerclage placement. We reviewed the risks of cerclage procedure in detail. We discussed recommendations for pelvic rest, avoidance of moderate to high impact exercise, and no heavy lifting following cerclage placement. Progesterone supplementation is not indicated in absence of clinical concern for  labor signs or symptoms.     Patient was counseled on r/b/i/a of cerclage which include but are not limited to risk of anesthesia, pain, bleeding, infection, injury to adjacent structures, rupture of membranes, potential inability to place, and potential failure to prevent  delivery.  We plan to re-evaluate early fetal anatomical structure in 3-4 weeks as well as recommend NIPT testing. If all is normal, will plan for cervical cerclage placement at 12-14 weeks EGA.    24- Ultrasound evaluation reveals normal anatomical structure at this very early gestational age. She understands limitations, particularly with early ultrasound. Cerclage planned outpatient at [facility]  @   7/3/24- s/p cerclage placement. Doing well. Continuing vaginal progesterone. TVU-CL normal with cerclage noted to be tightly in place.  24- TVU CL normal  24- Called office earlier today with complaints of vaginal spotting over weekend. Noticed Saturday while eating at a restaurant she started having some spotting along with mid lower abd pressure. Denies cramping/tightening. She was able to provide a picture of toilet tissue that had light pink smear on it. She placed a panty liner and continued to have scant amt of light pink spotting until this morning. Since this morning,  she has not had any more spotting. TVU CL normal. Cerclage noted to be tightly in place. Precautions provided.  - Resolved VB. Doing well.     Recommendations:  S/p cervical cerclage placement - 24  Currently taking vaginal progesterone 200mg QHS. Continue until 37w.  Follow-up with MFM 1-2 weeks after cerclage for post-operative exam  Monitor for signs of symptoms of  labor (LOF, vaginal bleeding, regular contractions) and signs of infection  To OB ED with concern for  labor or infection following cerclage placement  Remove cerclage at 36-37 weeks, unless indicated earlier

## 2024-08-28 NOTE — PROGRESS NOTES
"Maternal Fetal Medicine follow up consult    SUBJECTIVE:     Latoya Gupta is a 24 y.o.  female with IUP at 22w0d who is seen in follow up consultation by M.  Pregnancy complications include:   Problem   - Cervical insufficiency during pregnancy, antepartum   - Antiphospholipid syndrome during pregnancy       She is doing well. She is having some nausea and gave her OK to take zofran PRN and we discussed constipation.   No LOF, VB or ctx.     Previous notes reviewed.   No changes to medical, surgical, family, social, or obstetric history.  Interval history since last M visit: see above  Medications reviewed.    Care team members:  Dr Caraballo - Primary OB     OBJECTIVE:   /73 (BP Location: Right arm)   Pulse 90   Ht 5' 5" (1.651 m)   Wt 60.2 kg (132 lb 11.5 oz)   BMI 22.09 kg/m²     Ultrasound performed. See viewpoint for full ultrasound report.    A viable wolf pregnancy is visualized in breech presentation.  Estimated fetal weight is at the 63rd percentile with an abdominal circumference at the 92nd percentile.    No fetal abnormalities are noted and previous anatomic survey was normal. Amniotic fluid volume is normal.  Placenta is anterior.  Transvaginal cervical length measures 3.2cm with cerclage in place.    ASSESSMENT/PLAN:     24 y.o.  female with IUP at 22w0d    - Antiphospholipid syndrome during pregnancy  I reviewed the risks of APLS with the patient.  She is aware of the increased risk of venous thromboembolic disease and stroke.  We discussed the pregnancy risks which include prematurity, IUGR, preeclampsia, and miscarriage/stillbirth.   3/5/24- Beta 2 glycoprotein positive.     At this time, the diagnosis of APLS is unclear as her history of pregnancy loss is variable in etiology (1 partial molar pregnancy, 1 early SAB, 1 incompetent cx/sPTB at 20w) and she has only had one positive lab eval. Will continue Lovenox at this time and plan for repeat labs 12w after initial " assessment. Further recs will be provided after repeat labs obtained.    24- She has [facility] to [facility] We are working on getting these records.  We discussed holding lovenox for cerclage and restarting that evening.     7/3/24- S/p cerclage. Resumed lovenox - going well.    24- Doing well. No changes in regimen today.    - No more spotting. No changes    - Cervical insufficiency during pregnancy, antepartum  I reviewed the patient's history which is remarkable for delivery at 20.5 weeks gestation. A short cervix was noted at 20.3w, then, two days later, she PPROM'ed and delivered.  We reviewed the concept of cervical insufficiency and how it differs from  labor. We discussed recommendations for management which include history indicated cervical cerclage placement. We reviewed the risks of cerclage procedure in detail. We discussed recommendations for pelvic rest, avoidance of moderate to high impact exercise, and no heavy lifting following cerclage placement. Progesterone supplementation is not indicated in absence of clinical concern for  labor signs or symptoms.     Patient was counseled on r/b/i/a of cerclage which include but are not limited to risk of anesthesia, pain, bleeding, infection, injury to adjacent structures, rupture of membranes, potential inability to place, and potential failure to prevent  delivery.  We plan to re-evaluate early fetal anatomical structure in 3-4 weeks as well as recommend NIPT testing. If all is normal, will plan for cervical cerclage placement at 12-14 weeks EGA.    24- Ultrasound evaluation reveals normal anatomical structure at this very early gestational age. She understands limitations, particularly with early ultrasound. Cerclage planned outpatient at [facility]  @ 7a  7/3/24- s/p cerclage placement. Doing well. Continuing vaginal progesterone. TVU-CL normal with cerclage noted to be  tightly in place.  24- TVU CL normal  24- Called office earlier today with complaints of vaginal spotting over weekend. Noticed Saturday while eating at a restaurant she started having some spotting along with mid lower abd pressure. Denies cramping/tightening. She was able to provide a picture of toilet tissue that had light pink smear on it. She placed a panty liner and continued to have scant amt of light pink spotting until this morning. Since this morning, she has not had any more spotting. TVU CL normal. Cerclage noted to be tightly in place. Precautions provided.  - Resolved VB. Doing well.     Recommendations:  S/p cervical cerclage placement - 24  Currently taking vaginal progesterone 200mg QHS. Continue until 37w.  Follow-up with MFM 1-2 weeks after cerclage for post-operative exam  Monitor for signs of symptoms of  labor (LOF, vaginal bleeding, regular contractions) and signs of infection  To OB ED with concern for  labor or infection following cerclage placement  Remove cerclage at 36-37 weeks, unless indicated earlier      F/u in 4 weeks for MFM visit/ Ultrasound     Nahomi Chun MD  Maternal Fetal Medicine

## 2024-09-18 ENCOUNTER — LAB VISIT (OUTPATIENT)
Dept: LAB | Facility: HOSPITAL | Age: 24
End: 2024-09-18
Attending: STUDENT IN AN ORGANIZED HEALTH CARE EDUCATION/TRAINING PROGRAM
Payer: MEDICAID

## 2024-09-18 DIAGNOSIS — Z34.81 PRENATAL CARE, SUBSEQUENT PREGNANCY, FIRST TRIMESTER: Primary | ICD-10-CM

## 2024-09-18 LAB
ERYTHROCYTE [DISTWIDTH] IN BLOOD BY AUTOMATED COUNT: 13.1 % (ref 11.5–17)
GLUCOSE 1H P 100 G GLC PO SERPL-MCNC: 101 MG/DL (ref 100–180)
HCT VFR BLD AUTO: 34.1 % (ref 37–47)
HGB BLD-MCNC: 11.5 G/DL (ref 12–16)
HIV 1+2 AB+HIV1 P24 AG SERPL QL IA: NONREACTIVE
MCH RBC QN AUTO: 31 PG (ref 27–31)
MCHC RBC AUTO-ENTMCNC: 33.7 G/DL (ref 33–36)
MCV RBC AUTO: 91.9 FL (ref 80–94)
NRBC BLD AUTO-RTO: 0 %
PLATELET # BLD AUTO: 171 X10(3)/MCL (ref 130–400)
PMV BLD AUTO: 10.9 FL (ref 7.4–10.4)
RBC # BLD AUTO: 3.71 X10(6)/MCL (ref 4.2–5.4)
T PALLIDUM AB SER QL: NONREACTIVE
WBC # BLD AUTO: 7.3 X10(3)/MCL (ref 4.5–11.5)

## 2024-09-18 PROCEDURE — 85027 COMPLETE CBC AUTOMATED: CPT

## 2024-09-18 PROCEDURE — 36415 COLL VENOUS BLD VENIPUNCTURE: CPT

## 2024-09-18 PROCEDURE — 86780 TREPONEMA PALLIDUM: CPT

## 2024-09-18 PROCEDURE — 82950 GLUCOSE TEST: CPT

## 2024-09-18 PROCEDURE — 87389 HIV-1 AG W/HIV-1&-2 AB AG IA: CPT

## 2024-09-19 DIAGNOSIS — O99.119 ANTIPHOSPHOLIPID SYNDROME DURING PREGNANCY: Primary | ICD-10-CM

## 2024-09-19 DIAGNOSIS — D68.61 ANTIPHOSPHOLIPID SYNDROME DURING PREGNANCY: Primary | ICD-10-CM

## 2024-09-19 DIAGNOSIS — O34.30 CERVICAL INSUFFICIENCY DURING PREGNANCY, ANTEPARTUM: ICD-10-CM

## 2024-09-19 DIAGNOSIS — O26.20 RECURRENT PREGNANCY LOSS, CURRENTLY PREGNANT: ICD-10-CM

## 2024-09-25 ENCOUNTER — PROCEDURE VISIT (OUTPATIENT)
Dept: MATERNAL FETAL MEDICINE | Facility: CLINIC | Age: 24
End: 2024-09-25
Payer: MEDICAID

## 2024-09-25 ENCOUNTER — OFFICE VISIT (OUTPATIENT)
Dept: MATERNAL FETAL MEDICINE | Facility: CLINIC | Age: 24
End: 2024-09-25
Payer: MEDICAID

## 2024-09-25 VITALS
BODY MASS INDEX: 23.32 KG/M2 | DIASTOLIC BLOOD PRESSURE: 73 MMHG | WEIGHT: 140 LBS | SYSTOLIC BLOOD PRESSURE: 117 MMHG | HEIGHT: 65 IN | HEART RATE: 86 BPM

## 2024-09-25 DIAGNOSIS — O99.119 ANTIPHOSPHOLIPID SYNDROME DURING PREGNANCY: ICD-10-CM

## 2024-09-25 DIAGNOSIS — O34.30 CERVICAL INSUFFICIENCY DURING PREGNANCY, ANTEPARTUM: Primary | ICD-10-CM

## 2024-09-25 DIAGNOSIS — O34.30 CERVICAL INSUFFICIENCY DURING PREGNANCY, ANTEPARTUM: ICD-10-CM

## 2024-09-25 DIAGNOSIS — O26.20 RECURRENT PREGNANCY LOSS, CURRENTLY PREGNANT: ICD-10-CM

## 2024-09-25 DIAGNOSIS — D68.61 ANTIPHOSPHOLIPID SYNDROME DURING PREGNANCY: ICD-10-CM

## 2024-09-25 RX ORDER — FERROUS SULFATE 325(65) MG
325 TABLET ORAL DAILY
COMMUNITY

## 2024-09-25 NOTE — ASSESSMENT & PLAN NOTE
I reviewed the patient's history which is remarkable for delivery at 20.5 weeks gestation. A short cervix was noted at 20.3w, then, two days later, she PPROM'ed and delivered.  We reviewed the concept of cervical insufficiency and how it differs from  labor. We discussed recommendations for management which include history indicated cervical cerclage placement. We reviewed the risks of cerclage procedure in detail. We discussed recommendations for pelvic rest, avoidance of moderate to high impact exercise, and no heavy lifting following cerclage placement. Progesterone supplementation is not indicated in absence of clinical concern for  labor signs or symptoms.     Patient was counseled on r/b/i/a of cerclage which include but are not limited to risk of anesthesia, pain, bleeding, infection, injury to adjacent structures, rupture of membranes, potential inability to place, and potential failure to prevent  delivery.  We plan to re-evaluate early fetal anatomical structure in 3-4 weeks as well as recommend NIPT testing. If all is normal, will plan for cervical cerclage placement at 12-14 weeks EGA.    24- Ultrasound evaluation reveals normal anatomical structure at this very early gestational age. She understands limitations, particularly with early ultrasound. Cerclage planned outpatient at [facility]  @   7/3/24- s/p cerclage placement. Doing well. Continuing vaginal progesterone. TVU-CL normal with cerclage noted to be tightly in place.  24- TVU CL normal  24- Called office earlier today with complaints of vaginal spotting over weekend. Noticed Saturday while eating at a restaurant she started having some spotting along with mid lower abd pressure. Denies cramping/tightening. She was able to provide a picture of toilet tissue that had light pink smear on it. She placed a panty liner and continued to have scant amt of light pink spotting until this morning. Since this morning,  she has not had any more spotting. TVU CL normal. Cerclage noted to be tightly in place. Precautions provided.  - Resolved VB. Doing well.   24- doing well.  Still has occasional vaginal spotting but says always resolves quickly each time.  Likely due to her Lovenox.    Recommendations:  S/p cervical cerclage placement - 24  Currently taking vaginal progesterone 200mg QHS. Continue until 37w.  Monitor for signs of symptoms of  labor (LOF, vaginal bleeding, regular contractions) and signs of infection  To OB ED with concern for  labor or infection following cerclage placement  Remove cerclage at 36-37 weeks, unless indicated earlier (by OB)

## 2024-09-25 NOTE — PROGRESS NOTES
"Maternal Fetal Medicine follow up consult    SUBJECTIVE:     Latoya Gupta is a 24 y.o.  female with IUP at 26w0d who is seen in follow up consultation by MFM.  Pregnancy complications include:   Problem   - Cervical insufficiency during pregnancy, antepartum   - Antiphospholipid syndrome during pregnancy       She is feeling well and has no current complaints.  She still has occasional vaginal spotting but says it is often on and never gets worse.  She states it is very light just on her toilet paper for brief times.  I discussed with her that this is likely due to her Lovenox.  No LOF or ctx.   She passed her glucose test.  Planning for cerclage removal by primary OB at 36 weeks.    Previous notes reviewed.   No changes to medical, surgical, family, social, or obstetric history.  Interval history since last MFM visit: see above  Medications reviewed.    Care team members:  Dr Caraballo - Primary OB     OBJECTIVE:   /73 (BP Location: Right arm)   Pulse 86   Ht 5' 5" (1.651 m)   Wt 63.5 kg (139 lb 15.9 oz)   BMI 23.30 kg/m²     Ultrasound performed. See viewpoint for full ultrasound report.    A viable wolf pregnancy is visualized in cephalic presentation.  Estimated fetal weight is at the 67th percentile with an abdominal circumference at the 69th percentile.    No fetal abnormalities are noted and previous anatomic survey was normal. Amniotic fluid volume is normal.  Placenta is anterior.     ASSESSMENT/PLAN:     24 y.o.  female with IUP at 26w0d    - Cervical insufficiency during pregnancy, antepartum  I reviewed the patient's history which is remarkable for delivery at 20.5 weeks gestation. A short cervix was noted at 20.3w, then, two days later, she PPROM'ed and delivered.  We reviewed the concept of cervical insufficiency and how it differs from  labor. We discussed recommendations for management which include history indicated cervical cerclage placement. We reviewed the risks " of cerclage procedure in detail. We discussed recommendations for pelvic rest, avoidance of moderate to high impact exercise, and no heavy lifting following cerclage placement. Progesterone supplementation is not indicated in absence of clinical concern for  labor signs or symptoms.     Patient was counseled on r/b/i/a of cerclage which include but are not limited to risk of anesthesia, pain, bleeding, infection, injury to adjacent structures, rupture of membranes, potential inability to place, and potential failure to prevent  delivery.  We plan to re-evaluate early fetal anatomical structure in 3-4 weeks as well as recommend NIPT testing. If all is normal, will plan for cervical cerclage placement at 12-14 weeks EGA.    24- Ultrasound evaluation reveals normal anatomical structure at this very early gestational age. She understands limitations, particularly with early ultrasound. Cerclage planned outpatient at [facility]  @ 7a  7/3/24- s/p cerclage placement. Doing well. Continuing vaginal progesterone. TVU-CL normal with cerclage noted to be tightly in place.  24- TVU CL normal  24- Called office earlier today with complaints of vaginal spotting over weekend. Noticed Saturday while eating at a restaurant she started having some spotting along with mid lower abd pressure. Denies cramping/tightening. She was able to provide a picture of toilet tissue that had light pink smear on it. She placed a panty liner and continued to have scant amt of light pink spotting until this morning. Since this morning, she has not had any more spotting. TVU CL normal. Cerclage noted to be tightly in place. Precautions provided.  - Resolved VB. Doing well.   24- doing well.  Still has occasional vaginal spotting but says always resolves quickly each time.  Likely due to her Lovenox.    Recommendations:  S/p cervical cerclage placement - 24  Currently taking vaginal progesterone 200mg QHS.  Continue until 37w.  Monitor for signs of symptoms of  labor (LOF, vaginal bleeding, regular contractions) and signs of infection  To OB ED with concern for  labor or infection following cerclage placement  Remove cerclage at 36-37 weeks, unless indicated earlier (by OB)    - Antiphospholipid syndrome during pregnancy  I reviewed the risks of APLS with the patient.  She is aware of the increased risk of venous thromboembolic disease and stroke.  We discussed the pregnancy risks which include prematurity, IUGR, preeclampsia, and miscarriage/stillbirth.   3/5/24- Beta 2 glycoprotein positive.     At this time, the diagnosis of APLS is unclear as her history of pregnancy loss is variable in etiology (1 partial molar pregnancy, 1 early SAB, 1 incompetent cx/sPTB at 20w) and she has only had one positive lab eval. Will continue Lovenox at this time and plan for repeat labs 12w after initial assessment. Further recs will be provided after repeat labs obtained.    24- She has an [facility] to [facility] We are working on getting these records.  We discussed holding lovenox for cerclage and restarting that evening.     7/3/24- S/p cerclage. Resumed lovenox - going well.          F/u in 4 weeks for MFM visit/ Ultrasound     Nahomi Chun MD  Maternal Fetal Medicine

## 2024-09-25 NOTE — ASSESSMENT & PLAN NOTE
I reviewed the risks of APLS with the patient.  She is aware of the increased risk of venous thromboembolic disease and stroke.  We discussed the pregnancy risks which include prematurity, IUGR, preeclampsia, and miscarriage/stillbirth.   3/5/24- Beta 2 glycoprotein positive.     At this time, the diagnosis of APLS is unclear as her history of pregnancy loss is variable in etiology (1 partial molar pregnancy, 1 early SAB, 1 incompetent cx/sPTB at 20w) and she has only had one positive lab eval. Will continue Lovenox at this time and plan for repeat labs 12w after initial assessment. Further recs will be provided after repeat labs obtained.    6/12/24- She has an [facility] to [facility] We are working on getting these records.  We discussed holding lovenox for cerclage and restarting that evening.     7/3/24- S/p cerclage. Resumed lovenox - going well.

## 2024-10-22 DIAGNOSIS — Z36.89 ENCOUNTER FOR ULTRASOUND TO CHECK FETAL GROWTH: Primary | ICD-10-CM

## 2024-10-23 ENCOUNTER — OFFICE VISIT (OUTPATIENT)
Dept: MATERNAL FETAL MEDICINE | Facility: CLINIC | Age: 24
End: 2024-10-23
Payer: MEDICAID

## 2024-10-23 ENCOUNTER — PROCEDURE VISIT (OUTPATIENT)
Dept: MATERNAL FETAL MEDICINE | Facility: CLINIC | Age: 24
End: 2024-10-23
Payer: MEDICAID

## 2024-10-23 VITALS
SYSTOLIC BLOOD PRESSURE: 110 MMHG | HEART RATE: 90 BPM | DIASTOLIC BLOOD PRESSURE: 67 MMHG | BODY MASS INDEX: 24.6 KG/M2 | WEIGHT: 147.81 LBS

## 2024-10-23 DIAGNOSIS — O99.119 ANTIPHOSPHOLIPID SYNDROME DURING PREGNANCY: Primary | ICD-10-CM

## 2024-10-23 DIAGNOSIS — Z36.89 ENCOUNTER FOR ULTRASOUND TO CHECK FETAL GROWTH: ICD-10-CM

## 2024-10-23 DIAGNOSIS — O34.30 CERVICAL INSUFFICIENCY DURING PREGNANCY, ANTEPARTUM: ICD-10-CM

## 2024-10-23 DIAGNOSIS — D68.61 ANTIPHOSPHOLIPID SYNDROME DURING PREGNANCY: Primary | ICD-10-CM

## 2024-10-23 NOTE — ASSESSMENT & PLAN NOTE
I reviewed the patient's history which is remarkable for delivery at 20.5 weeks gestation. A short cervix was noted at 20.3w, then, two days later, she PPROM'ed and delivered.  We reviewed the concept of cervical insufficiency and how it differs from  labor. We discussed recommendations for management which include history indicated cervical cerclage placement. We reviewed the risks of cerclage procedure in detail. We discussed recommendations for pelvic rest, avoidance of moderate to high impact exercise, and no heavy lifting following cerclage placement. Progesterone supplementation is not indicated in absence of clinical concern for  labor signs or symptoms.     Patient was counseled on r/b/i/a of cerclage which include but are not limited to risk of anesthesia, pain, bleeding, infection, injury to adjacent structures, rupture of membranes, potential inability to place, and potential failure to prevent  delivery.  We plan to re-evaluate early fetal anatomical structure in 3-4 weeks as well as recommend NIPT testing. If all is normal, will plan for cervical cerclage placement at 12-14 weeks EGA.    24- Ultrasound evaluation reveals normal anatomical structure at this very early gestational age. She understands limitations, particularly with early ultrasound. [facility]  @   7/3/24- s/p cerclage placement. Doing well. Continuing vaginal progesterone. TVU-CL normal with cerclage noted to be tightly in place.  24- TVU CL normal  24- Called office earlier today with complaints of vaginal spotting over weekend. Noticed Saturday while eating at a restaurant she started having some spotting along with mid lower abd pressure. Denies cramping/tightening. She was able to provide a picture of toilet tissue that had light pink smear on it. She placed a panty liner and continued to have scant amt of light pink spotting until this morning. Since this morning,  she has not had any more spotting. TVU CL normal. Cerclage noted to be tightly in place. Precautions provided.  - Resolved VB. Doing well.   24- doing well.  Still has occasional vaginal spotting but says always resolves quickly each time.  Likely due to her Lovenox.  10/23/24- no further spotting. Doing well.     Recommendations:  S/p cervical cerclage placement - 24  Currently taking vaginal progesterone 200mg QHS. Continue until 37w.  Monitor for signs of symptoms of  labor (LOF, vaginal bleeding, regular contractions) and signs of infection  To OB ED with concern for  labor or infection following cerclage placement  Remove cerclage at 36-37 weeks, unless indicated earlier (by OB)

## 2024-10-23 NOTE — PROGRESS NOTES
Maternal Fetal Medicine follow up consult    SUBJECTIVE:     Latoya Gupta is a 24 y.o.  female with IUP at 30w0d who is seen in follow up consultation by MFM.  Pregnancy complications include:   Problem   - Cervical insufficiency during pregnancy, antepartum   - Antiphospholipid syndrome during pregnancy       She is feeling well and has no current complaints.  She is no longer having spotting since her last appt. No new changes. No LOF or ctx.   Planning for cerclage removal by primary OB at 36 weeks.    Previous notes reviewed.   No changes to medical, surgical, family, social, or obstetric history.  Interval history since last MFM visit: see above  Medications reviewed.    Care team members:  Dr Caraballo - Primary OB     OBJECTIVE:   /67   Pulse 90   Wt 67 kg (147 lb 13.1 oz)   BMI 24.60 kg/m²     Ultrasound performed. See viewpoint for full ultrasound report.    A viable wolf pregnancy is visualized in cephalic presentation.  Estimated fetal weight is at the 58th percentile with an abdominal circumference at the 81st percentile.    No fetal abnormalities are noted and previous anatomic survey was normal. Amniotic fluid volume is normal.  Placenta is anterior.    ASSESSMENT/PLAN:     24 y.o.  female with IUP at 30w0d    - Antiphospholipid syndrome during pregnancy  I reviewed the risks of APLS with the patient.  She is aware of the increased risk of venous thromboembolic disease and stroke.  We discussed the pregnancy risks which include prematurity, IUGR, preeclampsia, and miscarriage/stillbirth.   3/5/24- Beta 2 glycoprotein positive.     At this time, the diagnosis of APLS is unclear as her history of pregnancy loss is variable in etiology (1 partial molar pregnancy, 1 early SAB, 1 incompetent cx/sPTB at 20w) and she has only had one positive lab eval. Will continue Lovenox at this time and plan for repeat labs 12w after initial assessment. Further recs will be provided after repeat  labs obtained.    6/12/24- She has an order to repeat APLS labs and she says she did them at [facility]. We are working on getting these records.  We discussed holding lovenox for cerclage and restarting that evening.     7/3/24- S/p cerclage. Resumed lovenox - going well.    I reviewed the risks of APLS with the patient.  She is aware of the increased risk of venous thromboembolic disease and stroke.  We discussed the pregnancy risks which include prematurity, IUGR, preeclampsia, and miscarriage/stillbirth.     Recommendations:  Given her history of VTE, We reviewed the need to continue therapeutic anticoagulation during the pregnancy and in the postpartum period./In the absence of VTE/stroke, we recommend prophylactic LMWH during pregnancy and for 6 weeks postpartum.   aspirin 81 mg daily at confirmation of pregnancy  Growth ultrasounds are recommended   Close surveillance in late second and third trimester for preeclampsia  Delivery should be between 39 and 0/7 weeks gestation to 39 and 6/7 weeks gestation, unless indicated earlier.    Anticoagulation recommendations below:  -Prophylaxis Regimen:  For patients with a BMI =< 40, prescribe lovenox 40 mg daily  For patients with a BMI > 40, prescribe lovenox 40 mg BID  UFH dose is 4060-0427 units BID, 7500-28005 units BID, and 02387 units BID in the first, second, and third trimesters, respectively    -Peripartum Management  -EDILSON hose/SCDs should be used while anticoagulation is interrupted.  -Regardless of whether the patient is on prophylactic dose UFH or LMWH, the last dose should be 12 hours prior to neuraxial anesthesia. For this reason, we no longer recommend conversion to UFH at 36 weeks.   -In patients with neuraxial anesthesia: prophylactic anticoagulation should not be initiated within 12 hours of neuraxial blockade or within 4 hours of epidural removal, whichever is later; therapeutic anticoagulation with LMWH should not be initiated within 24 hours of  neuraxial blockade or within 4 hours of epidural removal, whichever is later.   -Prophylactic anticoagulation (defer to timing related to neuraxial anesthesia)  After vaginal delivery: should be started no sooner than 6 hours    After  delivery: should be started no sooner than 12 hours  For patients at exceedingly high risk of VTE, this may be modified in consultation with MFM.    Breastfeeding is compatible with warfarin, UFH, and LMWH.          - Cervical insufficiency during pregnancy, antepartum  I reviewed the patient's history which is remarkable for delivery at 20.5 weeks gestation. A short cervix was noted at 20.3w, then, two days later, she PPROM'ed and delivered.  We reviewed the concept of cervical insufficiency and how it differs from  labor. We discussed recommendations for management which include history indicated cervical cerclage placement. We reviewed the risks of cerclage procedure in detail. We discussed recommendations for pelvic rest, avoidance of moderate to high impact exercise, and no heavy lifting following cerclage placement. Progesterone supplementation is not indicated in absence of clinical concern for  labor signs or symptoms.     Patient was counseled on r/b/i/a of cerclage which include but are not limited to risk of anesthesia, pain, bleeding, infection, injury to adjacent structures, rupture of membranes, potential inability to place, and potential failure to prevent  delivery.  We plan to re-evaluate early fetal anatomical structure in 3-4 weeks as well as recommend NIPT testing. If all is normal, will plan for cervical cerclage placement at 12-14 weeks EGA.    24- Ultrasound evaluation reveals normal anatomical structure at this very early gestational age. She understands limitations, particularly with early ultrasound. Cerclage planned outpatient at [facility]  @   7/3/24- s/p cerclage placement. Doing well. Continuing vaginal progesterone.  TVU-CL normal with cerclage noted to be tightly in place.  24- TVU CL normal  24- Called office earlier today with complaints of vaginal spotting over weekend. Noticed Saturday while eating at a restaurant she started having some spotting along with mid lower abd pressure. Denies cramping/tightening. She was able to provide a picture of toilet tissue that had light pink smear on it. She placed a panty liner and continued to have scant amt of light pink spotting until this morning. Since this morning, she has not had any more spotting. TVU CL normal. Cerclage noted to be tightly in place. Precautions provided.  - Resolved VB. Doing well.   24- doing well.  Still has occasional vaginal spotting but says always resolves quickly each time.  Likely due to her Lovenox.  10/23/24- no further spotting. Doing well.     Recommendations:  S/p cervical cerclage placement - 24  Currently taking vaginal progesterone 200mg QHS. Continue until 37w.  Monitor for signs of symptoms of  labor (LOF, vaginal bleeding, regular contractions) and signs of infection  To OB ED with concern for  labor or infection following cerclage placement  Remove cerclage at 36-37 weeks, unless indicated earlier (by OB)        F/u in 4 weeks for MFM visit/ Ultrasound     Nahomi Chun MD  Maternal Fetal Medicine

## 2024-10-23 NOTE — ASSESSMENT & PLAN NOTE
I reviewed the risks of APLS with the patient.  She is aware of the increased risk of venous thromboembolic disease and stroke.  We discussed the pregnancy risks which include prematurity, IUGR, preeclampsia, and miscarriage/stillbirth.   3/5/24- Beta 2 glycoprotein positive.     At this time, the diagnosis of APLS is unclear as her history of pregnancy loss is variable in etiology (1 partial molar pregnancy, 1 early SAB, 1 incompetent cx/sPTB at 20w) and she has only had one positive lab eval. Will continue Lovenox at this time and plan for repeat labs 12w after initial assessment. Further recs will be provided after repeat labs obtained.    6/12/24- She has an order to repeat APLS labs and she says she did them at [facility]. We are working on getting these records.  We discussed holding lovenox for cerclage and restarting that evening.     7/3/24- S/p cerclage. Resumed lovenox - going well.    I reviewed the risks of APLS with the patient.  She is aware of the increased risk of venous thromboembolic disease and stroke.  We discussed the pregnancy risks which include prematurity, IUGR, preeclampsia, and miscarriage/stillbirth.     Recommendations:  Given her history of VTE, We reviewed the need to continue therapeutic anticoagulation during the pregnancy and in the postpartum period./In the absence of VTE/stroke, we recommend prophylactic LMWH during pregnancy and for 6 weeks postpartum.   aspirin 81 mg daily at confirmation of pregnancy  Growth ultrasounds are recommended   Close surveillance in late second and third trimester for preeclampsia  Delivery should be between 39 and 0/7 weeks gestation to 39 and 6/7 weeks gestation, unless indicated earlier.    Anticoagulation recommendations below:  -Prophylaxis Regimen:  For patients with a BMI =< 40, prescribe lovenox 40 mg daily  For patients with a BMI > 40, prescribe lovenox 40 mg BID  UFH dose is 9429-5068 units BID, 7500-33169 units BID, and 10702 units BID in  the first, second, and third trimesters, respectively    -Peripartum Management  -EDILSON hose/SCDs should be used while anticoagulation is interrupted.  -Regardless of whether the patient is on prophylactic dose UFH or LMWH, the last dose should be 12 hours prior to neuraxial anesthesia. For this reason, we no longer recommend conversion to UFH at 36 weeks.   -In patients with neuraxial anesthesia: prophylactic anticoagulation should not be initiated within 12 hours of neuraxial blockade or within 4 hours of epidural removal, whichever is later; therapeutic anticoagulation with LMWH should not be initiated within 24 hours of neuraxial blockade or within 4 hours of epidural removal, whichever is later.   -Prophylactic anticoagulation (defer to timing related to neuraxial anesthesia)  After vaginal delivery: should be started no sooner than 6 hours    After  delivery: should be started no sooner than 12 hours  For patients at exceedingly high risk of VTE, this may be modified in consultation with MFM.    Breastfeeding is compatible with warfarin, UFH, and LMWH.

## 2024-11-19 DIAGNOSIS — O99.119 ANTIPHOSPHOLIPID SYNDROME DURING PREGNANCY: Primary | ICD-10-CM

## 2024-11-19 DIAGNOSIS — O34.30 CERVICAL INSUFFICIENCY DURING PREGNANCY, ANTEPARTUM: ICD-10-CM

## 2024-11-19 DIAGNOSIS — D68.61 ANTIPHOSPHOLIPID SYNDROME DURING PREGNANCY: Primary | ICD-10-CM

## 2024-11-20 ENCOUNTER — PROCEDURE VISIT (OUTPATIENT)
Dept: MATERNAL FETAL MEDICINE | Facility: CLINIC | Age: 24
End: 2024-11-20
Payer: MEDICAID

## 2024-11-20 ENCOUNTER — OFFICE VISIT (OUTPATIENT)
Dept: MATERNAL FETAL MEDICINE | Facility: CLINIC | Age: 24
End: 2024-11-20
Payer: MEDICAID

## 2024-11-20 VITALS
HEART RATE: 92 BPM | BODY MASS INDEX: 26.41 KG/M2 | HEIGHT: 65 IN | WEIGHT: 158.5 LBS | DIASTOLIC BLOOD PRESSURE: 76 MMHG | SYSTOLIC BLOOD PRESSURE: 121 MMHG

## 2024-11-20 DIAGNOSIS — O34.30 CERVICAL INSUFFICIENCY DURING PREGNANCY, ANTEPARTUM: ICD-10-CM

## 2024-11-20 DIAGNOSIS — O26.20 RECURRENT PREGNANCY LOSS, CURRENTLY PREGNANT: ICD-10-CM

## 2024-11-20 DIAGNOSIS — D68.61 ANTIPHOSPHOLIPID SYNDROME DURING PREGNANCY: Primary | ICD-10-CM

## 2024-11-20 DIAGNOSIS — O99.119 ANTIPHOSPHOLIPID SYNDROME DURING PREGNANCY: ICD-10-CM

## 2024-11-20 DIAGNOSIS — O99.119 ANTIPHOSPHOLIPID SYNDROME DURING PREGNANCY: Primary | ICD-10-CM

## 2024-11-20 DIAGNOSIS — D68.61 ANTIPHOSPHOLIPID SYNDROME DURING PREGNANCY: ICD-10-CM

## 2024-11-20 PROCEDURE — 99213 OFFICE O/P EST LOW 20 MIN: CPT | Mod: TH,S$GLB,, | Performed by: OBSTETRICS & GYNECOLOGY

## 2024-11-20 PROCEDURE — 76819 FETAL BIOPHYS PROFIL W/O NST: CPT | Mod: S$GLB,,, | Performed by: OBSTETRICS & GYNECOLOGY

## 2024-11-20 PROCEDURE — 3074F SYST BP LT 130 MM HG: CPT | Mod: CPTII,S$GLB,, | Performed by: OBSTETRICS & GYNECOLOGY

## 2024-11-20 PROCEDURE — 1159F MED LIST DOCD IN RCRD: CPT | Mod: CPTII,S$GLB,, | Performed by: OBSTETRICS & GYNECOLOGY

## 2024-11-20 PROCEDURE — 3078F DIAST BP <80 MM HG: CPT | Mod: CPTII,S$GLB,, | Performed by: OBSTETRICS & GYNECOLOGY

## 2024-11-20 PROCEDURE — 1160F RVW MEDS BY RX/DR IN RCRD: CPT | Mod: CPTII,S$GLB,, | Performed by: OBSTETRICS & GYNECOLOGY

## 2024-11-20 PROCEDURE — 76816 OB US FOLLOW-UP PER FETUS: CPT | Mod: S$GLB,,, | Performed by: OBSTETRICS & GYNECOLOGY

## 2024-11-20 PROCEDURE — 3008F BODY MASS INDEX DOCD: CPT | Mod: CPTII,S$GLB,, | Performed by: OBSTETRICS & GYNECOLOGY

## 2024-11-20 NOTE — ASSESSMENT & PLAN NOTE
I reviewed the patient's history which is remarkable for delivery at 20.5 weeks gestation. A short cervix was noted at 20.3w, then, two days later, she PPROM'ed and delivered.  We reviewed the concept of cervical insufficiency and how it differs from  labor. We discussed recommendations for management which include history indicated cervical cerclage placement. We reviewed the risks of cerclage procedure in detail. We discussed recommendations for pelvic rest, avoidance of moderate to high impact exercise, and no heavy lifting following cerclage placement. Progesterone supplementation is not indicated in absence of clinical concern for  labor signs or symptoms.     Patient was counseled on r/b/i/a of cerclage which include but are not limited to risk of anesthesia, pain, bleeding, infection, injury to adjacent structures, rupture of membranes, potential inability to place, and potential failure to prevent  delivery.  We plan to re-evaluate early fetal anatomical structure in 3-4 weeks as well as recommend NIPT testing. If all is normal, will plan for cervical cerclage placement at 12-14 weeks EGA.    24- Ultrasound evaluation reveals normal anatomical structure at this very early gestational age. She understands limitations, particularly with early ultrasound. [facility]  @   7/3/24- s/p cerclage placement. Doing well. Continuing vaginal progesterone. TVU-CL normal with cerclage noted to be tightly in place.  24- TVU CL normal  24- Called office earlier today with complaints of vaginal spotting over weekend. Noticed Saturday while eating at a restaurant she started having some spotting along with mid lower abd pressure. Denies cramping/tightening. She was able to provide a picture of toilet tissue that had light pink smear on it. She placed a panty liner and continued to have scant amt of light pink spotting until this morning. Since this morning,  she has not had any more spotting. TVU CL normal. Cerclage noted to be tightly in place. Precautions provided.  - Resolved VB. Doing well.   24- doing well.  Still has occasional vaginal spotting but says always resolves quickly each time.  Likely due to her Lovenox.  10/23/24- no further spotting. Doing well.     Recommendations:  S/p cervical cerclage placement - 24  Currently taking vaginal progesterone 200mg QHS. Continue until 37w.  Monitor for signs of symptoms of  labor (LOF, vaginal bleeding, regular contractions) and signs of infection  To OB ED with concern for  labor or infection following cerclage placement  Remove cerclage at 36-37 weeks, unless indicated earlier (by OB)

## 2024-11-20 NOTE — PROGRESS NOTES
"Maternal Fetal Medicine follow up consult    SUBJECTIVE:     Latoya Gupta is a 24 y.o.  female with IUP at 34w0d who is seen in follow up consultation by MFM.  Pregnancy complications include:   Problem   - Cervical insufficiency during pregnancy, antepartum   - Recurrent pregnancy loss, currently pregnant   - Antiphospholipid syndrome during pregnancy       She is feeling well and has no current complaints. No LOF, VB, ctx. +FM. Cerclage removal scheduled . She will hold lovenox - and resume on the . Discussed precautions.     Previous notes reviewed.   No changes to medical, surgical, family, social, or obstetric history.  Interval history since last M visit: see above  Medications reviewed.    Care team members:  Dr Caraballo - Primary OB     OBJECTIVE:   /76 (BP Location: Right arm, Patient Position: Sitting)   Pulse 92   Ht 5' 5" (1.651 m)   Wt 71.9 kg (158 lb 8.2 oz)   BMI 26.38 kg/m²     Ultrasound performed. See viewpoint for full ultrasound report.    A viable wolf pregnancy is visualized in cephalic presentation.  Estimated fetal weight is at the 53rd percentile with an abdominal circumference at the 77th percentile.    No fetal abnormalities are noted and previous anatomic survey was normal. Amniotic fluid volume is normal.  Placenta is anterior. Biophysical profile is 8/8.     ASSESSMENT/PLAN:     24 y.o.  female with IUP at 34w0d    - Antiphospholipid syndrome during pregnancy  I reviewed the risks of APLS with the patient.  She is aware of the increased risk of venous thromboembolic disease and stroke.  We discussed the pregnancy risks which include prematurity, IUGR, preeclampsia, and miscarriage/stillbirth.   3/5/24- Beta 2 glycoprotein positive.     At this time, the diagnosis of APLS is unclear as her history of pregnancy loss is variable in etiology (1 partial molar pregnancy, 1 early SAB, 1 incompetent cx/sPTB at 20w) and she has only had one positive lab " flavia. Will continue Lovenox at this time and plan for repeat labs 12w after initial assessment. Further recs will be provided after repeat labs obtained.    6/12/24- She has an order to repeat APLS labs and she says she did them at [facility]. We are working on getting these records.  We discussed holding lovenox for cerclage and restarting that evening.     I reviewed the risks of APLS with the patient.  She is aware of the increased risk of venous thromboembolic disease and stroke.  We discussed the pregnancy risks which include prematurity, IUGR, preeclampsia, and miscarriage/stillbirth.     Cerclage removal scheduled 12/5. She will hold lovenox 12/4-5 and resume on the 6th. Discussed precautions.     Recommendations:  Given her history of VTE, We reviewed the need to continue therapeutic anticoagulation during the pregnancy and in the postpartum period./In the absence of VTE/stroke, we recommend prophylactic LMWH during pregnancy and for 6 weeks postpartum.   aspirin 81 mg daily at confirmation of pregnancy  Growth ultrasounds are recommended   Close surveillance in late second and third trimester for preeclampsia  Delivery should be between 39 and 0/7 weeks gestation to 39 and 6/7 weeks gestation, unless indicated earlier.    Anticoagulation recommendations below:  -Prophylaxis Regimen:  For patients with a BMI =< 40, prescribe lovenox 40 mg daily  For patients with a BMI > 40, prescribe lovenox 40 mg BID  UFH dose is 4037-4009 units BID, 7500-70007 units BID, and 20217 units BID in the first, second, and third trimesters, respectively    -Peripartum Management  -EDILSON hose/SCDs should be used while anticoagulation is interrupted.  -Regardless of whether the patient is on prophylactic dose UFH or LMWH, the last dose should be 12 hours prior to neuraxial anesthesia. For this reason, we no longer recommend conversion to UFH at 36 weeks.   -In patients with neuraxial anesthesia: prophylactic anticoagulation should not  be initiated within 12 hours of neuraxial blockade or within 4 hours of epidural removal, whichever is later; therapeutic anticoagulation with LMWH should not be initiated within 24 hours of neuraxial blockade or within 4 hours of epidural removal, whichever is later.   -Prophylactic anticoagulation (defer to timing related to neuraxial anesthesia)  After vaginal delivery: should be started no sooner than 6 hours    After  delivery: should be started no sooner than 12 hours  For patients at exceedingly high risk of VTE, this may be modified in consultation with MFM.    Breastfeeding is compatible with warfarin, UFH, and LMWH.          - Cervical insufficiency during pregnancy, antepartum  I reviewed the patient's history which is remarkable for delivery at 20.5 weeks gestation. A short cervix was noted at 20.3w, then, two days later, she PPROM'ed and delivered.  We reviewed the concept of cervical insufficiency and how it differs from  labor. We discussed recommendations for management which include history indicated cervical cerclage placement. We reviewed the risks of cerclage procedure in detail. We discussed recommendations for pelvic rest, avoidance of moderate to high impact exercise, and no heavy lifting following cerclage placement. Progesterone supplementation is not indicated in absence of clinical concern for  labor signs or symptoms.     Patient was counseled on r/b/i/a of cerclage which include but are not limited to risk of anesthesia, pain, bleeding, infection, injury to adjacent structures, rupture of membranes, potential inability to place, and potential failure to prevent  delivery.  We plan to re-evaluate early fetal anatomical structure in 3-4 weeks as well as recommend NIPT testing. If all is normal, will plan for cervical cerclage placement at 12-14 weeks EGA.    24- Ultrasound evaluation reveals normal anatomical structure at this very early gestational age.  She understands limitations, particularly with early ultrasound. Cerclage planned outpatient at [facility]  @ 7a  7/3/24- s/p cerclage placement. Doing well. Continuing vaginal progesterone. TVU-CL normal with cerclage noted to be tightly in place.  24- TVU CL normal  24- Called office earlier today with complaints of vaginal spotting over weekend. Noticed Saturday while eating at a restaurant she started having some spotting along with mid lower abd pressure. Denies cramping/tightening. She was able to provide a picture of toilet tissue that had light pink smear on it. She placed a panty liner and continued to have scant amt of light pink spotting until this morning. Since this morning, she has not had any more spotting. TVU CL normal. Cerclage noted to be tightly in place. Precautions provided.  - Resolved VB. Doing well.   24- doing well.  Still has occasional vaginal spotting but says always resolves quickly each time.  Likely due to her Lovenox.  10/23/24- no further spotting. Doing well.     Recommendations:  S/p cervical cerclage placement - 24  Currently taking vaginal progesterone 200mg QHS. Continue until 37w.  Monitor for signs of symptoms of  labor (LOF, vaginal bleeding, regular contractions) and signs of infection  To OB ED with concern for  labor or infection following cerclage placement  Remove cerclage at 36-37 weeks, unless indicated earlier (by OB)    - Recurrent pregnancy loss, currently pregnant  She's had one partial molar pregnancy @ 13w, one early SAB, and one previable sPTB at 20w d/t cervical insufficiency.    Miscarriage is the most common complication of early pregnancy. An estimated 8-20% of clinically recognized pregnancies less than 20 weeks of gestation will miscarry. 80% of miscarriages happen in the first 12 weeks of gestation. Chromosomal abnormalities account for approximately 50% of all miscarriages and the earlier the gestational age at  miscarriage the higher the incidence. Other etiologies for miscarriage include congenital anomalies, maternal uterine anomalies, poorly controlled thyroid disease or diabetes, and acute maternal infection. Some miscarriages are unexplained. Recurrent pregnancy loss (RPL) refers to three or more consecutive losses of clinically recognized pregnancies prior to 20 weeks of gestation. While approximately 15% of women experience 1 miscarriage, only 2% experience 2 consecutive losses and less than 1% experience 3 consecutive losses.   In women with a history of two or more prior losses, the chance of a viable birth after ultrasound  detection of fetal cardiac activity is approximately 77%. ?  After evaluation, recurrent pregnancy loss remains unexplained in approximately one-half of couples. Nevertheless, the chance of a live birth after three unexplained RPLs is over 50 percent with no intervention.     3/5/22- Beta 2 glycoprotein I Ab IgG elevated (22), Beta 2 glycoprotein I Ab IgA elevated (33) - see separate documentation    Repeat APLS testing completed on 6/10/24- persistently elevated beta 2 glycoprotein    Recommendations:  Repeat testing 12 week after initial draw (around 6/5/24) with anticardiolipin antibody (IgG and IgM) titer, lupus anticoagulant, and beta-2 glycoprotein IgG and IgM titers - completed as noted aboveAn inherited thrombophilia panel is not recommended for RPLEvaluation of thyroid function with TSH and for maternal diabetes with 2 hour glucose tolerance test or hemoglobin A1C (completed 3/5/24)            F/u in 4 weeks for M visit/ Ultrasound     Nahomi Chun MD  Maternal Fetal Medicine

## 2024-11-20 NOTE — ASSESSMENT & PLAN NOTE
I reviewed the risks of APLS with the patient.  She is aware of the increased risk of venous thromboembolic disease and stroke.  We discussed the pregnancy risks which include prematurity, IUGR, preeclampsia, and miscarriage/stillbirth.   3/5/24- Beta 2 glycoprotein positive.     At this time, the diagnosis of APLS is unclear as her history of pregnancy loss is variable in etiology (1 partial molar pregnancy, 1 early SAB, 1 incompetent cx/sPTB at 20w) and she has only had one positive lab eval. Will continue Lovenox at this time and plan for repeat labs 12w after initial assessment. Further recs will be provided after repeat labs obtained.    6/12/24- She has an order to repeat APLS labs and she says she did them at [facility]. We are working on getting these records.  We discussed holding lovenox for cerclage and restarting that evening.     I reviewed the risks of APLS with the patient.  She is aware of the increased risk of venous thromboembolic disease and stroke.  We discussed the pregnancy risks which include prematurity, IUGR, preeclampsia, and miscarriage/stillbirth.     Cerclage removal scheduled 12/5. She will hold lovenox 12/4-5 and resume on the 6th. Discussed precautions.     Recommendations:  Given her history of VTE, We reviewed the need to continue therapeutic anticoagulation during the pregnancy and in the postpartum period./In the absence of VTE/stroke, we recommend prophylactic LMWH during pregnancy and for 6 weeks postpartum.   aspirin 81 mg daily at confirmation of pregnancy  Growth ultrasounds are recommended   Close surveillance in late second and third trimester for preeclampsia  Delivery should be between 39 and 0/7 weeks gestation to 39 and 6/7 weeks gestation, unless indicated earlier.    Anticoagulation recommendations below:  -Prophylaxis Regimen:  For patients with a BMI =< 40, prescribe lovenox 40 mg daily  For patients with a BMI > 40, prescribe lovenox 40 mg BID  UFH dose is 1160-9223  units BID, 7500-95135 units BID, and 52954 units BID in the first, second, and third trimesters, respectively    -Peripartum Management  -EDILSON hose/SCDs should be used while anticoagulation is interrupted.  -Regardless of whether the patient is on prophylactic dose UFH or LMWH, the last dose should be 12 hours prior to neuraxial anesthesia. For this reason, we no longer recommend conversion to UFH at 36 weeks.   -In patients with neuraxial anesthesia: prophylactic anticoagulation should not be initiated within 12 hours of neuraxial blockade or within 4 hours of epidural removal, whichever is later; therapeutic anticoagulation with LMWH should not be initiated within 24 hours of neuraxial blockade or within 4 hours of epidural removal, whichever is later.   -Prophylactic anticoagulation (defer to timing related to neuraxial anesthesia)  After vaginal delivery: should be started no sooner than 6 hours    After  delivery: should be started no sooner than 12 hours  For patients at exceedingly high risk of VTE, this may be modified in consultation with MFM.    Breastfeeding is compatible with warfarin, UFH, and LMWH.

## 2024-11-20 NOTE — ASSESSMENT & PLAN NOTE
She's had one partial molar pregnancy @ 13w, one early SAB, and one previable sPTB at 20w d/t cervical insufficiency.    Miscarriage is the most common complication of early pregnancy. An estimated 8-20% of clinically recognized pregnancies less than 20 weeks of gestation will miscarry. 80% of miscarriages happen in the first 12 weeks of gestation. Chromosomal abnormalities account for approximately 50% of all miscarriages and the earlier the gestational age at miscarriage the higher the incidence. Other etiologies for miscarriage include congenital anomalies, maternal uterine anomalies, poorly controlled thyroid disease or diabetes, and acute maternal infection. Some miscarriages are unexplained. Recurrent pregnancy loss (RPL) refers to three or more consecutive losses of clinically recognized pregnancies prior to 20 weeks of gestation. While approximately 15% of women experience 1 miscarriage, only 2% experience 2 consecutive losses and less than 1% experience 3 consecutive losses.   In women with a history of two or more prior losses, the chance of a viable birth after ultrasound  detection of fetal cardiac activity is approximately 77%. ?  After evaluation, recurrent pregnancy loss remains unexplained in approximately one-half of couples. Nevertheless, the chance of a live birth after three unexplained RPLs is over 50 percent with no intervention.     3/5/22- Beta 2 glycoprotein I Ab IgG elevated (22), Beta 2 glycoprotein I Ab IgA elevated (33) - see separate documentation    Repeat APLS testing completed on 6/10/24- persistently elevated beta 2 glycoprotein    Recommendations:  Repeat testing 12 week after initial draw (around 6/5/24) with anticardiolipin antibody (IgG and IgM) titer, lupus anticoagulant, and beta-2 glycoprotein IgG and IgM titers - completed as noted aboveAn inherited thrombophilia panel is not recommended for RPLEvaluation of thyroid function with TSH and for maternal diabetes with 2 hour  glucose tolerance test or hemoglobin A1C (completed 3/5/24)       no

## 2024-12-05 ENCOUNTER — APPOINTMENT (OUTPATIENT)
Dept: LAB | Facility: HOSPITAL | Age: 24
End: 2024-12-05
Payer: MEDICAID

## 2024-12-12 ENCOUNTER — HOSPITAL ENCOUNTER (INPATIENT)
Facility: HOSPITAL | Age: 24
LOS: 1 days | Discharge: HOME | End: 2024-12-15
Attending: OBSTETRICS & GYNECOLOGY | Admitting: OBSTETRICS & GYNECOLOGY
Payer: MEDICAID

## 2024-12-12 DIAGNOSIS — D69.6: ICD-10-CM

## 2024-12-12 DIAGNOSIS — O34.30: ICD-10-CM

## 2024-12-12 DIAGNOSIS — O99.119 ANTIPHOSPHOLIPID ANTIBODY SYNDROME COMPLICATING PREGNANCY: ICD-10-CM

## 2024-12-12 DIAGNOSIS — D69.6 THROMBOCYTOPENIA: ICD-10-CM

## 2024-12-12 DIAGNOSIS — D68.61 ANTIPHOSPHOLIPID ANTIBODY SYNDROME COMPLICATING PREGNANCY: ICD-10-CM

## 2024-12-12 DIAGNOSIS — O26.20 RECURRENT PREGNANCY LOSS, DELIVERED: ICD-10-CM

## 2024-12-12 DIAGNOSIS — O99.119: ICD-10-CM

## 2024-12-12 DIAGNOSIS — Z37.9 NORMAL LABOR: ICD-10-CM

## 2024-12-12 DIAGNOSIS — D68.61: ICD-10-CM

## 2024-12-12 LAB
ALBUMIN SERPL-MCNC: 3.1 G/DL (ref 3.5–5)
ALBUMIN/GLOB SERPL: 1 RATIO (ref 1.1–2)
ALP SERPL-CCNC: 145 UNIT/L (ref 40–150)
ALT SERPL-CCNC: 14 UNIT/L (ref 0–55)
ANION GAP SERPL CALC-SCNC: 9 MEQ/L
AST SERPL-CCNC: 24 UNIT/L (ref 5–34)
BACTERIA #/AREA URNS AUTO: ABNORMAL /HPF
BASOPHILS # BLD AUTO: 0.03 X10(3)/MCL
BASOPHILS NFR BLD AUTO: 0.3 %
BILIRUB SERPL-MCNC: 0.4 MG/DL
BILIRUB UR QL STRIP.AUTO: NEGATIVE
BUN SERPL-MCNC: 8.1 MG/DL (ref 7–18.7)
CALCIUM SERPL-MCNC: 8.7 MG/DL (ref 8.4–10.2)
CHLORIDE SERPL-SCNC: 106 MMOL/L (ref 98–107)
CLARITY UR: CLEAR
CO2 SERPL-SCNC: 22 MMOL/L (ref 22–29)
COLOR UR AUTO: ABNORMAL
CREAT SERPL-MCNC: 0.59 MG/DL (ref 0.55–1.02)
CREAT UR-MCNC: 91 MG/DL (ref 45–106)
CREAT/UREA NIT SERPL: 14
EOSINOPHIL # BLD AUTO: 0.08 X10(3)/MCL (ref 0–0.9)
EOSINOPHIL NFR BLD AUTO: 0.8 %
ERYTHROCYTE [DISTWIDTH] IN BLOOD BY AUTOMATED COUNT: 13.1 % (ref 11.5–17)
GFR SERPLBLD CREATININE-BSD FMLA CKD-EPI: >60 ML/MIN/1.73/M2
GLOBULIN SER-MCNC: 3.2 GM/DL (ref 2.4–3.5)
GLUCOSE SERPL-MCNC: 84 MG/DL (ref 74–100)
GLUCOSE UR QL STRIP: NORMAL
HCT VFR BLD AUTO: 34.6 % (ref 37–47)
HGB BLD-MCNC: 11.8 G/DL (ref 12–16)
HGB UR QL STRIP: NEGATIVE
IMM GRANULOCYTES # BLD AUTO: 0.11 X10(3)/MCL (ref 0–0.04)
IMM GRANULOCYTES NFR BLD AUTO: 1.2 %
KETONES UR QL STRIP: NEGATIVE
LDH SERPL-CCNC: 170 U/L (ref 125–220)
LEUKOCYTE ESTERASE UR QL STRIP: 500
LYMPHOCYTES # BLD AUTO: 1.87 X10(3)/MCL (ref 0.6–4.6)
LYMPHOCYTES NFR BLD AUTO: 19.6 %
MCH RBC QN AUTO: 30.7 PG (ref 27–31)
MCHC RBC AUTO-ENTMCNC: 34.1 G/DL (ref 33–36)
MCV RBC AUTO: 90.1 FL (ref 80–94)
MONOCYTES # BLD AUTO: 0.74 X10(3)/MCL (ref 0.1–1.3)
MONOCYTES NFR BLD AUTO: 7.8 %
MUCOUS THREADS URNS QL MICRO: ABNORMAL /LPF
NEUTROPHILS # BLD AUTO: 6.71 X10(3)/MCL (ref 2.1–9.2)
NEUTROPHILS NFR BLD AUTO: 70.3 %
NITRITE UR QL STRIP: NEGATIVE
NRBC BLD AUTO-RTO: 0 %
PH UR STRIP: 6.5 [PH]
PLATELET # BLD AUTO: 113 X10(3)/MCL (ref 130–400)
PMV BLD AUTO: 12.2 FL (ref 7.4–10.4)
POTASSIUM SERPL-SCNC: 4 MMOL/L (ref 3.5–5.1)
PROT SERPL-MCNC: 6.3 GM/DL (ref 6.4–8.3)
PROT UR QL STRIP: ABNORMAL
PROT UR STRIP-MCNC: 11.4 MG/DL
RBC # BLD AUTO: 3.84 X10(6)/MCL (ref 4.2–5.4)
RBC #/AREA URNS AUTO: ABNORMAL /HPF
SODIUM SERPL-SCNC: 137 MMOL/L (ref 136–145)
SP GR UR STRIP.AUTO: 1.02 (ref 1–1.03)
SQUAMOUS #/AREA URNS LPF: ABNORMAL /HPF
URATE SERPL-MCNC: 6.4 MG/DL (ref 2.6–6)
URINE PROTEIN/CREATININE RATIO (OLG): 0.1
UROBILINOGEN UR STRIP-ACNC: 2
WBC # BLD AUTO: 9.54 X10(3)/MCL (ref 4.5–11.5)
WBC #/AREA URNS AUTO: ABNORMAL /HPF

## 2024-12-12 PROCEDURE — 80053 COMPREHEN METABOLIC PANEL: CPT | Performed by: OBSTETRICS & GYNECOLOGY

## 2024-12-12 PROCEDURE — 84156 ASSAY OF PROTEIN URINE: CPT | Performed by: OBSTETRICS & GYNECOLOGY

## 2024-12-12 PROCEDURE — 84550 ASSAY OF BLOOD/URIC ACID: CPT | Performed by: OBSTETRICS & GYNECOLOGY

## 2024-12-12 PROCEDURE — 99285 EMERGENCY DEPT VISIT HI MDM: CPT

## 2024-12-12 PROCEDURE — 81001 URINALYSIS AUTO W/SCOPE: CPT | Performed by: OBSTETRICS & GYNECOLOGY

## 2024-12-12 PROCEDURE — 83615 LACTATE (LD) (LDH) ENZYME: CPT | Performed by: OBSTETRICS & GYNECOLOGY

## 2024-12-12 PROCEDURE — 27000492 HC SLEEVE, SCD T/L

## 2024-12-12 PROCEDURE — G0378 HOSPITAL OBSERVATION PER HR: HCPCS

## 2024-12-12 PROCEDURE — 85025 COMPLETE CBC W/AUTO DIFF WBC: CPT | Performed by: OBSTETRICS & GYNECOLOGY

## 2024-12-12 RX ORDER — ENOXAPARIN SODIUM 100 MG/ML
40 INJECTION SUBCUTANEOUS
Status: DISPENSED | OUTPATIENT
Start: 2024-12-12 | End: 2024-12-13

## 2024-12-12 NOTE — Clinical Note
Transfer To (Destination): [facility] ANTEPARTUM [[id number]]   Diagnosis: Antiphospholipid antibody syndrome complicating pregnancy [290017]   Future Attending Provider: MATEUS MASTERS [[id number]]

## 2024-12-13 PROBLEM — O60.00 PRETERM LABOR: Status: ACTIVE | Noted: 2024-12-13

## 2024-12-13 PROBLEM — D69.6 GESTATIONAL THROMBOCYTOPENIA WITHOUT HEMORRHAGE IN THIRD TRIMESTER: Status: ACTIVE | Noted: 2024-12-13

## 2024-12-13 PROBLEM — Z37.9 NORMAL LABOR: Status: ACTIVE | Noted: 2024-12-13

## 2024-12-13 PROBLEM — O99.113 GESTATIONAL THROMBOCYTOPENIA WITHOUT HEMORRHAGE IN THIRD TRIMESTER: Status: ACTIVE | Noted: 2024-12-13

## 2024-12-13 LAB
ABORH RETYPE: NORMAL
ALBUMIN SERPL-MCNC: 2.9 G/DL (ref 3.5–5)
ALBUMIN/GLOB SERPL: 0.9 RATIO (ref 1.1–2)
ALP SERPL-CCNC: 145 UNIT/L (ref 40–150)
ALT SERPL-CCNC: 15 UNIT/L (ref 0–55)
ANION GAP SERPL CALC-SCNC: 6 MEQ/L
AST SERPL-CCNC: 22 UNIT/L (ref 5–34)
BASOPHILS # BLD AUTO: 0.03 X10(3)/MCL
BASOPHILS NFR BLD AUTO: 0.3 %
BILIRUB SERPL-MCNC: 0.4 MG/DL
BUN SERPL-MCNC: 10.8 MG/DL (ref 7–18.7)
CALCIUM SERPL-MCNC: 8.1 MG/DL (ref 8.4–10.2)
CHLORIDE SERPL-SCNC: 108 MMOL/L (ref 98–107)
CO2 SERPL-SCNC: 23 MMOL/L (ref 22–29)
CREAT SERPL-MCNC: 0.58 MG/DL (ref 0.55–1.02)
CREAT/UREA NIT SERPL: 19
EOSINOPHIL # BLD AUTO: 0.08 X10(3)/MCL (ref 0–0.9)
EOSINOPHIL NFR BLD AUTO: 0.9 %
ERYTHROCYTE [DISTWIDTH] IN BLOOD BY AUTOMATED COUNT: 13.2 % (ref 11.5–17)
GFR SERPLBLD CREATININE-BSD FMLA CKD-EPI: >60 ML/MIN/1.73/M2
GLOBULIN SER-MCNC: 3.2 GM/DL (ref 2.4–3.5)
GLUCOSE SERPL-MCNC: 99 MG/DL (ref 74–100)
GROUP & RH: NORMAL
HBV SURFACE AG SERPL QL IA: NONREACTIVE
HCT VFR BLD AUTO: 33.3 % (ref 37–47)
HGB BLD-MCNC: 11.2 G/DL (ref 12–16)
HIV 1+2 AB+HIV1 P24 AG SERPL QL IA: NONREACTIVE
IMM GRANULOCYTES # BLD AUTO: 0.13 X10(3)/MCL (ref 0–0.04)
IMM GRANULOCYTES NFR BLD AUTO: 1.4 %
INDIRECT COOMBS: NORMAL
LYMPHOCYTES # BLD AUTO: 1.94 X10(3)/MCL (ref 0.6–4.6)
LYMPHOCYTES NFR BLD AUTO: 20.9 %
MCH RBC QN AUTO: 30.9 PG (ref 27–31)
MCHC RBC AUTO-ENTMCNC: 33.6 G/DL (ref 33–36)
MCV RBC AUTO: 91.7 FL (ref 80–94)
MONOCYTES # BLD AUTO: 0.72 X10(3)/MCL (ref 0.1–1.3)
MONOCYTES NFR BLD AUTO: 7.8 %
NEUTROPHILS # BLD AUTO: 6.38 X10(3)/MCL (ref 2.1–9.2)
NEUTROPHILS NFR BLD AUTO: 68.7 %
NRBC BLD AUTO-RTO: 0 %
PLATELET # BLD AUTO: 108 X10(3)/MCL (ref 130–400)
PMV BLD AUTO: 12.7 FL (ref 7.4–10.4)
POTASSIUM SERPL-SCNC: 3.9 MMOL/L (ref 3.5–5.1)
PROT SERPL-MCNC: 6.1 GM/DL (ref 6.4–8.3)
RBC # BLD AUTO: 3.63 X10(6)/MCL (ref 4.2–5.4)
SODIUM SERPL-SCNC: 137 MMOL/L (ref 136–145)
SPECIMEN OUTDATE: NORMAL
T PALLIDUM AB SER QL: NONREACTIVE
WBC # BLD AUTO: 9.28 X10(3)/MCL (ref 4.5–11.5)

## 2024-12-13 PROCEDURE — 86923 COMPATIBILITY TEST ELECTRIC: CPT | Mod: 91 | Performed by: OBSTETRICS & GYNECOLOGY

## 2024-12-13 PROCEDURE — 86780 TREPONEMA PALLIDUM: CPT | Performed by: OBSTETRICS & GYNECOLOGY

## 2024-12-13 PROCEDURE — 25000003 PHARM REV CODE 250: Performed by: OBSTETRICS & GYNECOLOGY

## 2024-12-13 PROCEDURE — G0378 HOSPITAL OBSERVATION PER HR: HCPCS

## 2024-12-13 PROCEDURE — 85025 COMPLETE CBC W/AUTO DIFF WBC: CPT | Performed by: NURSE PRACTITIONER

## 2024-12-13 PROCEDURE — 63600175 PHARM REV CODE 636 W HCPCS: Performed by: OBSTETRICS & GYNECOLOGY

## 2024-12-13 PROCEDURE — 99215 OFFICE O/P EST HI 40 MIN: CPT | Mod: TH,,, | Performed by: NURSE PRACTITIONER

## 2024-12-13 PROCEDURE — 36415 COLL VENOUS BLD VENIPUNCTURE: CPT | Performed by: STUDENT IN AN ORGANIZED HEALTH CARE EDUCATION/TRAINING PROGRAM

## 2024-12-13 PROCEDURE — 25000003 PHARM REV CODE 250: Performed by: NURSE PRACTITIONER

## 2024-12-13 PROCEDURE — 87389 HIV-1 AG W/HIV-1&-2 AB AG IA: CPT | Performed by: OBSTETRICS & GYNECOLOGY

## 2024-12-13 PROCEDURE — 86901 BLOOD TYPING SEROLOGIC RH(D): CPT | Performed by: OBSTETRICS & GYNECOLOGY

## 2024-12-13 PROCEDURE — 87340 HEPATITIS B SURFACE AG IA: CPT | Performed by: OBSTETRICS & GYNECOLOGY

## 2024-12-13 PROCEDURE — 25000003 PHARM REV CODE 250: Performed by: ANESTHESIOLOGY

## 2024-12-13 PROCEDURE — 80053 COMPREHEN METABOLIC PANEL: CPT | Performed by: NURSE PRACTITIONER

## 2024-12-13 RX ORDER — MISOPROSTOL 100 UG/1
800 TABLET ORAL ONCE AS NEEDED
Status: DISCONTINUED | OUTPATIENT
Start: 2024-12-13 | End: 2024-12-14

## 2024-12-13 RX ORDER — LIDOCAINE HYDROCHLORIDE 10 MG/ML
10 INJECTION, SOLUTION INFILTRATION; PERINEURAL ONCE AS NEEDED
Status: DISCONTINUED | OUTPATIENT
Start: 2024-12-13 | End: 2024-12-14

## 2024-12-13 RX ORDER — ACETAMINOPHEN 650 MG/20.3ML
650 LIQUID ORAL EVERY 4 HOURS PRN
Status: DISCONTINUED | OUTPATIENT
Start: 2024-12-13 | End: 2024-12-13

## 2024-12-13 RX ORDER — OXYTOCIN 10 [USP'U]/ML
10 INJECTION, SOLUTION INTRAMUSCULAR; INTRAVENOUS ONCE AS NEEDED
Status: DISCONTINUED | OUTPATIENT
Start: 2024-12-13 | End: 2024-12-14

## 2024-12-13 RX ORDER — MUPIROCIN 20 MG/G
OINTMENT TOPICAL
Status: DISCONTINUED | OUTPATIENT
Start: 2024-12-13 | End: 2024-12-15 | Stop reason: HOSPADM

## 2024-12-13 RX ORDER — FENTANYL/BUPIVACAINE/NS/PF 2-1250MCG
PLASTIC BAG, INJECTION (ML) INJECTION CONTINUOUS
Status: DISCONTINUED | OUTPATIENT
Start: 2024-12-13 | End: 2024-12-14

## 2024-12-13 RX ORDER — ONDANSETRON 4 MG/1
8 TABLET, ORALLY DISINTEGRATING ORAL EVERY 8 HOURS PRN
Status: DISCONTINUED | OUTPATIENT
Start: 2024-12-13 | End: 2024-12-14

## 2024-12-13 RX ORDER — NALOXONE HCL 0.4 MG/ML
0.02 VIAL (ML) INJECTION
Status: DISCONTINUED | OUTPATIENT
Start: 2024-12-13 | End: 2024-12-15 | Stop reason: HOSPADM

## 2024-12-13 RX ORDER — OXYTOCIN/0.9 % SODIUM CHLORIDE 15/250 ML
95 PLASTIC BAG, INJECTION (ML) INTRAVENOUS CONTINUOUS PRN
Status: DISCONTINUED | OUTPATIENT
Start: 2024-12-13 | End: 2024-12-14

## 2024-12-13 RX ORDER — ACETAMINOPHEN 325 MG/1
650 TABLET ORAL ONCE
Status: COMPLETED | OUTPATIENT
Start: 2024-12-13 | End: 2024-12-13

## 2024-12-13 RX ORDER — EPHEDRINE SULFATE 50 MG/ML
10 INJECTION, SOLUTION INTRAVENOUS
Status: DISCONTINUED | OUTPATIENT
Start: 2024-12-13 | End: 2024-12-14

## 2024-12-13 RX ORDER — SIMETHICONE 80 MG
1 TABLET,CHEWABLE ORAL 4 TIMES DAILY PRN
Status: DISCONTINUED | OUTPATIENT
Start: 2024-12-13 | End: 2024-12-14

## 2024-12-13 RX ORDER — ACETAMINOPHEN 325 MG/1
650 TABLET ORAL EVERY 6 HOURS PRN
Status: DISCONTINUED | OUTPATIENT
Start: 2024-12-13 | End: 2024-12-15

## 2024-12-13 RX ORDER — OXYTOCIN/0.9 % SODIUM CHLORIDE 15/250 ML
0-32 PLASTIC BAG, INJECTION (ML) INTRAVENOUS CONTINUOUS
Status: DISCONTINUED | OUTPATIENT
Start: 2024-12-13 | End: 2024-12-15 | Stop reason: HOSPADM

## 2024-12-13 RX ORDER — SODIUM CHLORIDE 9 MG/ML
INJECTION, SOLUTION INTRAVENOUS
Status: DISCONTINUED | OUTPATIENT
Start: 2024-12-13 | End: 2024-12-14

## 2024-12-13 RX ORDER — SODIUM CITRATE AND CITRIC ACID MONOHYDRATE 334; 500 MG/5ML; MG/5ML
30 SOLUTION ORAL ONCE
Status: COMPLETED | OUTPATIENT
Start: 2024-12-13 | End: 2024-12-13

## 2024-12-13 RX ORDER — OXYTOCIN-SODIUM CHLORIDE 0.9% IV SOLN 30 UNIT/500ML 30-0.9/5 UT/ML-%
95 SOLUTION INTRAVENOUS ONCE AS NEEDED
Status: DISCONTINUED | OUTPATIENT
Start: 2024-12-13 | End: 2024-12-14

## 2024-12-13 RX ORDER — CARBOPROST TROMETHAMINE 250 UG/ML
250 INJECTION, SOLUTION INTRAMUSCULAR
Status: DISCONTINUED | OUTPATIENT
Start: 2024-12-13 | End: 2024-12-14

## 2024-12-13 RX ORDER — OXYTOCIN-SODIUM CHLORIDE 0.9% IV SOLN 30 UNIT/500ML 30-0.9/5 UT/ML-%
20 SOLUTION INTRAVENOUS ONCE AS NEEDED
Status: DISCONTINUED | OUTPATIENT
Start: 2024-12-13 | End: 2024-12-14

## 2024-12-13 RX ORDER — OXYTOCIN-SODIUM CHLORIDE 0.9% IV SOLN 30 UNIT/500ML 30-0.9/5 UT/ML-%
10 SOLUTION INTRAVENOUS ONCE AS NEEDED
Status: COMPLETED | OUTPATIENT
Start: 2024-12-13 | End: 2024-12-14

## 2024-12-13 RX ORDER — DIPHENOXYLATE HYDROCHLORIDE AND ATROPINE SULFATE 2.5; .025 MG/1; MG/1
2 TABLET ORAL EVERY 6 HOURS PRN
Status: DISCONTINUED | OUTPATIENT
Start: 2024-12-13 | End: 2024-12-14

## 2024-12-13 RX ORDER — METHYLERGONOVINE MALEATE 0.2 MG/ML
200 INJECTION INTRAVENOUS ONCE AS NEEDED
Status: DISCONTINUED | OUTPATIENT
Start: 2024-12-13 | End: 2024-12-14

## 2024-12-13 RX ORDER — SODIUM CHLORIDE, SODIUM LACTATE, POTASSIUM CHLORIDE, CALCIUM CHLORIDE 600; 310; 30; 20 MG/100ML; MG/100ML; MG/100ML; MG/100ML
INJECTION, SOLUTION INTRAVENOUS CONTINUOUS
Status: DISCONTINUED | OUTPATIENT
Start: 2024-12-13 | End: 2024-12-15 | Stop reason: HOSPADM

## 2024-12-13 RX ORDER — CALCIUM CARBONATE 200(500)MG
500 TABLET,CHEWABLE ORAL 3 TIMES DAILY PRN
Status: DISCONTINUED | OUTPATIENT
Start: 2024-12-13 | End: 2024-12-15 | Stop reason: HOSPADM

## 2024-12-13 RX ADMIN — AMPICILLIN SODIUM 2 G: 2 INJECTION, POWDER, FOR SOLUTION INTRAMUSCULAR; INTRAVENOUS at 10:12

## 2024-12-13 RX ADMIN — ACETAMINOPHEN 650 MG: 325 TABLET, FILM COATED ORAL at 08:12

## 2024-12-13 RX ADMIN — ONDANSETRON 8 MG: 4 TABLET, ORALLY DISINTEGRATING ORAL at 10:12

## 2024-12-13 RX ADMIN — OXYTOCIN 2 MILLI-UNITS/MIN: 10 INJECTION, SOLUTION INTRAMUSCULAR; INTRAVENOUS at 05:12

## 2024-12-13 RX ADMIN — ACETAMINOPHEN 650 MG: 325 TABLET, FILM COATED ORAL at 10:12

## 2024-12-13 RX ADMIN — SODIUM CITRATE AND CITRIC ACID MONOHYDRATE 30 ML: 500; 334 SOLUTION ORAL at 03:12

## 2024-12-13 RX ADMIN — SODIUM CHLORIDE, POTASSIUM CHLORIDE, SODIUM LACTATE AND CALCIUM CHLORIDE 500 ML: 600; 310; 30; 20 INJECTION, SOLUTION INTRAVENOUS at 11:12

## 2024-12-13 NOTE — CONSULTS
Consultation Note  Maternal Fetal Medicine          Subjective:         Latoya Gupta is a 24 y.o.  female with wolf IUP at 37w2d with significant history of cervical insufficiency requiring cervical cerclage and APLS on ppx LMWH who is admitted for new onset edema.    She reports having cervical cerclage removed last Thursday. Over the weekend, she had scant amt of brown spotting when wiping. Yesterday, she went shopping for approx 20 minutes. Toward the end of shopping, she started feeling faint and seeing spots like she was going to pass out. She was also feeling contractions at that time. She called her OB and was advised to come to the hospital for evaluation.     Since arrival, she has had regular contractions. AVE last night 2cm/50%. She reports not sleeping well last night due to discomfort associated with contractions. Lovenox dosage held last night due to ctx pattern. Has SCDs bilat LE. Requesting rpt cervical exam.  She reports a history of frequent migraines of which she had a Rx of Fioricet prior to pregnancy. She feels as though she has a mild headache currently but thinks it may be due to not sleeping well last night. Tylenol ordered.  Edema to bilat ankles 2+; pretibial 1+ pitting. Nml reflexes. Denies visual disturbances/RUQ pain. Has complaints of superficial numbness to upper abd x few weeks - thinks d/t advancing gestation. Denies SOB/cough, CP. All Bps have been normal since hospital arrival.  Initial lab evaluation last PM:  (new thrombocytopenia); UP:C 0.1, nml serum cr and LFTs. Rpt labs pending.    Nursing staff reports a brief episode of variable decels w/ ctx this AM - resolved w/ position change.      Review of Systems   Constitutional: Negative for fever, chills, malaise. Positive fatigue (did not sleep well last PM)  HENT: Positive for mild headache.   Eyes: Negative for visual disturbances.   Respiratory: Negative for cough and shortness of breath.     Cardiovascular: Negative for chest pain. Positive for edema.  Gastrointestinal:  Negative for abd pain, N/V/D. Negative for constipation.   Obstetrical: Negative for vaginal leaking/bleeding. Positive for contractions. Positive fetal movement.  Genitourinary: Negative for dysuria, frequency, urgency.   Musculoskeletal: Negative.    Neurological: Negative.    Psychiatric/Behavioral: Negative.        PMHx:   Past Medical History:   Diagnosis Date    Miscarriage     Phospholipid antibody syndrome affecting pregnancy     Thyroid disease     MASS ON THYROID       PSHx:   Past Surgical History:   Procedure Laterality Date    CERVICAL CERCLAGE N/A 6/26/2024    Procedure: CERCLAGE, CERVIX;  Surgeon: Nahomi Chun MD;  Location: [facility];  Service: OB/GYN;  Laterality: N/A;    DILATION AND CURETTAGE OF UTERUS  07/2023       All: Review of patient's allergies indicates:  No Known Allergies    Meds:   Medications Prior to Admission   Medication Sig Dispense Refill Last Dose/Taking    aspirin (ECOTRIN) 81 MG EC tablet Take 1 tablet (81 mg total) by mouth once daily. 30 tablet 11 12/12/2024    enoxaparin (LOVENOX) 40 mg/0.4 mL Syrg INJECT 0.4 MLS INTO THE SKIN EVERY DAY 11.2 mL 6 12/12/2024    ferrous sulfate (FEOSOL) 325 mg (65 mg iron) Tab tablet Take 325 mg by mouth once daily.   12/12/2024    prenatal vit no.130-iron-folic (PRENATAL VITAMIN) 27 mg iron- 800 mcg Tab Take 1 tablet by mouth Daily. 90 tablet 3 12/12/2024    progesterone (PROMETRIUM) 200 MG capsule Place 1 capsule (200 mg total) vaginally every evening. Until 36 weeks 30 capsule 5        SH:   Social History     Socioeconomic History    Marital status: Significant Other   Tobacco Use    Smoking status: Never     Passive exposure: Never    Smokeless tobacco: Never   Substance and Sexual Activity    Alcohol use: Not Currently    Drug use: Never    Sexual activity: Yes     Partners: Male     Birth control/protection: None     Comment: Pain with intercourse        FH:   Family History   Problem Relation Name Age of Onset     labor Maternal Grandmother      Miscarriages / Stillbirths Maternal Grandmother      Breast cancer Maternal Grandmother      Thyroid cancer Mother         OBHx:   OB History    Para Term  AB Living   5 1 0 1 3 0   SAB IAB Ectopic Multiple Live Births   3 0 0 0 1      # Outcome Date GA Lbr Kemal/2nd Weight Sex Type Anes PTL Lv   5 Current            4 2024 6w0d    SAB   FD   3 23 13w6d   F    FD      Birth Comments: D&C, partial molar      Name: Janet Navarro  20 20w5d 28:40 0.369 kg (13 oz) M Vag-Spont EPI Y ND      Birth Comments: incompetant cervix      Complications:  premature rupture of membranes (PPROM) with unknown onset of labor      Name: DODIE,PENDING      Apgar1: 1  Apgar5: 1   1 2019 6w0d      Y FD       Objective:         Temp:  [98.4 °F (36.9 °C)-98.5 °F (36.9 °C)] 98.5 °F (36.9 °C)  Pulse:  [] 75  Resp:  [17-19] 17  SpO2:  [96 %-98 %] 98 %  BP: (115-139)/(61-83) 117/72    Gen: NAD, A&Ox3  Pulm: Unlabored breathing, LCTAB  Card: RRR  Abd: FHT present, soft, nondistended, nontender to palpation, gravid uterus palpable c/w gestational age  Extremities: Palpable peripheral pulses, 2+ pitting edema bilat ankles, 1+ pitting edema pretib bilat, 2+ DTRs x 4, no clonus    SVE: 3cm/60%/-2/mid; intact membranes  White, milky discharge with mucus present on exam glove. No VB/LOF    NST: 130 baseline, moderate BTBV, pos accelerations, neg decelerations currently  Shaw: Q 10 mins  Most recent US (24-outpatient MFM clinic) - EFW 2708g (53%), AC 77%, BPP 8/8, MVP 6.2cm, anterior placenta, vtx presentation    Lab Review  Blood Type: O    Recent Results (from the past 24 hours)   Comprehensive metabolic panel    Collection Time: 24  7:57 PM   Result Value Ref Range    Sodium 137 136 - 145 mmol/L    Potassium 4.0 3.5 - 5.1 mmol/L    Chloride 106 98 - 107 mmol/L    CO2 22  22 - 29 mmol/L    Glucose 84 74 - 100 mg/dL    Blood Urea Nitrogen 8.1 7.0 - 18.7 mg/dL    Creatinine 0.59 0.55 - 1.02 mg/dL    Calcium 8.7 8.4 - 10.2 mg/dL    Protein Total 6.3 (L) 6.4 - 8.3 gm/dL    Albumin 3.1 (L) 3.5 - 5.0 g/dL    Globulin 3.2 2.4 - 3.5 gm/dL    Albumin/Globulin Ratio 1.0 (L) 1.1 - 2.0 ratio    Bilirubin Total 0.4 <=1.5 mg/dL     40 - 150 unit/L    ALT 14 0 - 55 unit/L    AST 24 5 - 34 unit/L    eGFR >60 mL/min/1.73/m2    Anion Gap 9.0 mEq/L    BUN/Creatinine Ratio 14    Uric acid    Collection Time: 12/12/24  7:57 PM   Result Value Ref Range    Uric Acid 6.4 (H) 2.6 - 6.0 mg/dL   Lactate Dehydrogenase    Collection Time: 12/12/24  7:57 PM   Result Value Ref Range    Lactate Dehydrogenase 170 125 - 220 U/L   Urinalysis, Reflex to Urine Culture    Collection Time: 12/12/24  7:57 PM    Specimen: Urine   Result Value Ref Range    Color, UA Light-Yellow Yellow, Light-Yellow, Colorless, Straw, Dark-Yellow    Appearance, UA Clear Clear    Specific Gravity, UA 1.020 1.005 - 1.030    pH, UA 6.5 5.0 - 8.5    Protein, UA Trace (A) Negative    Glucose, UA Normal Negative, Normal    Ketones, UA Negative Negative    Blood, UA Negative Negative    Bilirubin, UA Negative Negative    Urobilinogen, UA 2.0 (A) 0.2, 1.0, Normal    Nitrites, UA Negative Negative    Leukocyte Esterase,  (A) Negative    RBC, UA 0-5 None Seen, 0-2, 3-5, 0-5 /HPF    WBC, UA 6-10 (A) None Seen, 0-2, 3-5, 0-5 /HPF    Bacteria, UA Trace None Seen, Trace /HPF    Squamous Epithelial Cells, UA Occasional (A) None Seen, Trace, Rare /HPF    Mucous, UA Trace (A) None Seen /LPF   Protein/Creatinine Ratio, Urine    Collection Time: 12/12/24  7:57 PM   Result Value Ref Range    Urine Protein Level 11.4 mg/dL    Urine Creatinine 91.0 45.0 - 106.0 mg/dL    Urine Protein/Creatinine Ratio 0.1    CBC with Differential    Collection Time: 12/12/24  7:57 PM   Result Value Ref Range    WBC 9.54 4.50 - 11.50 x10(3)/mcL    RBC 3.84 (L) 4.20 -  5.40 x10(6)/mcL    Hgb 11.8 (L) 12.0 - 16.0 g/dL    Hct 34.6 (L) 37.0 - 47.0 %    MCV 90.1 80.0 - 94.0 fL    MCH 30.7 27.0 - 31.0 pg    MCHC 34.1 33.0 - 36.0 g/dL    RDW 13.1 11.5 - 17.0 %    Platelet 113 (L) 130 - 400 x10(3)/mcL    MPV 12.2 (H) 7.4 - 10.4 fL    Neut % 70.3 %    Lymph % 19.6 %    Mono % 7.8 %    Eos % 0.8 %    Basophil % 0.3 %    Lymph # 1.87 0.6 - 4.6 x10(3)/mcL    Neut # 6.71 2.1 - 9.2 x10(3)/mcL    Mono # 0.74 0.1 - 1.3 x10(3)/mcL    Eos # 0.08 0 - 0.9 x10(3)/mcL    Baso # 0.03 <=0.2 x10(3)/mcL    IG# 0.11 (H) 0 - 0.04 x10(3)/mcL    IG% 1.2 %    NRBC% 0.0 %   Comprehensive Metabolic Panel    Collection Time: 12/13/24  8:12 AM   Result Value Ref Range    Sodium 137 136 - 145 mmol/L    Potassium 3.9 3.5 - 5.1 mmol/L    Chloride 108 (H) 98 - 107 mmol/L    CO2 23 22 - 29 mmol/L    Glucose 99 74 - 100 mg/dL    Blood Urea Nitrogen 10.8 7.0 - 18.7 mg/dL    Creatinine 0.58 0.55 - 1.02 mg/dL    Calcium 8.1 (L) 8.4 - 10.2 mg/dL    Protein Total 6.1 (L) 6.4 - 8.3 gm/dL    Albumin 2.9 (L) 3.5 - 5.0 g/dL    Globulin 3.2 2.4 - 3.5 gm/dL    Albumin/Globulin Ratio 0.9 (L) 1.1 - 2.0 ratio    Bilirubin Total 0.4 <=1.5 mg/dL     40 - 150 unit/L    ALT 15 0 - 55 unit/L    AST 22 5 - 34 unit/L    eGFR >60 mL/min/1.73/m2    Anion Gap 6.0 mEq/L    BUN/Creatinine Ratio 19    CBC with Differential    Collection Time: 12/13/24  8:12 AM   Result Value Ref Range    WBC 9.28 4.50 - 11.50 x10(3)/mcL    RBC 3.63 (L) 4.20 - 5.40 x10(6)/mcL    Hgb 11.2 (L) 12.0 - 16.0 g/dL    Hct 33.3 (L) 37.0 - 47.0 %    MCV 91.7 80.0 - 94.0 fL    MCH 30.9 27.0 - 31.0 pg    MCHC 33.6 33.0 - 36.0 g/dL    RDW 13.2 11.5 - 17.0 %    Platelet 108 (L) 130 - 400 x10(3)/mcL    MPV 12.7 (H) 7.4 - 10.4 fL    Neut % 68.7 %    Lymph % 20.9 %    Mono % 7.8 %    Eos % 0.9 %    Basophil % 0.3 %    Lymph # 1.94 0.6 - 4.6 x10(3)/mcL    Neut # 6.38 2.1 - 9.2 x10(3)/mcL    Mono # 0.72 0.1 - 1.3 x10(3)/mcL    Eos # 0.08 0 - 0.9 x10(3)/mcL    Baso # 0.03  <=0.2 x10(3)/mcL    IG# 0.13 (H) 0 - 0.04 x10(3)/mcL    IG% 1.4 %    NRBC% 0.0 %       Assessment:       24 y.o.  at 37w2d weeks gestation admitted for edema    Active Hospital Problems    Diagnosis  POA    * labor [O60.00]  Yes      Resolved Hospital Problems   No resolved problems to display.        Plan:     1. Edema  Discussed possible etiologies including, but not limited to, advancing gestation and HTN disorders of pregnancy.   - All Bps normal since hospital arrival  - Pre-e workup unremarkable aside from PLT (discussed below). Labs repeated this AM to trend.  - Hx migraines. Has mild headache this AM but likely d/t not sleeping much last night. Tylenol ordered PRN.  - Denies visual disturbances/RUQ pain.    2. Thrombocytopenia  - New finding as of last PM (PLT 113K). Routine 3rd trimester labs w/ PLT at 171K on 24.  - Gestational thrombocytopenia vs atypical presentation of pre-eclampsia.   - Repeat labs this AM to trend. (Repeat PLT stable at 108K).  - Without other lab abnormalities and/or s/s suggestive of pre-eclampsia and normal BPs, likely gestational thrombocytopenia. Briefly discussed this condition.   - With repeat labs showing stable PLT, no treatment indicated.    - If discharged to home, recommend repeat CBC within one week.     3. Contractions  - Possible latent labor. Contractions ranging from q3-10m. Mild-moderate to palpation.  - Pt requesting repeat AVE this AM d/t persistent, uncomfortable contractions throughout the night and this morning. Repeat AVE with slight cervical change. Last PM 2cm/50%. This AM: 3cm/60%  - Denies LOF/VB.   - Continue to monitor for active labor  - Continue to hold Lovenox ppx.    4. APLS  - Currently on ppx Lovenox. Last dose 24 PM. Held PM dose last night d/t contractions. Continue to hold at this time.   - SCDs to manuel PLATT in place - continue while in house.   - If active labor ensues, plan to resume postpartum and continue x 6weeks. If  has a vaginal delivery WITHOUT epidural, anticoagulation should not be initiated any sooner than 6 hours postpartum. If delivers WITH epidural or spinal (regardless of delivery type), anticoagulation should not be initiated any sooner than 12 hours postpartum and must be at least 4 hours since epidural removal (if had epidural).     5. Cervical insufficiency  - Had cervical cerclage throughout pregnancy. Cerclage removed last week.      If no signs of labor progression, BPs remain normal, may discharge to home with low threshold to return to hospital for worsening symptoms, elevated BPs, labor, etc.       Discussed case with Dr Chun - discussed PLT assessments, all other lab results, current complaints - agrees with plan of care. Also discussed with Dr BIBI Love (on call coverage for Dr Caraballo) at nurse's station.  Thank you for allowing us to participate in the care of your patient. If you have any questions/concerns, please do not hesitate to contact us.     Queenie Lieberman, MSN, APRN, WHNP-BC  Maternal Fetal Medicine

## 2024-12-13 NOTE — PROGRESS NOTES
Patient has continued to contract rating her pain 6 to 7/10.  There has been good fetal movement.  Vitals:    24 0030 24 0130 24 0230 24 0329   BP: 117/66 115/67 117/62 128/73    24 0430 24 0530 24 0629 24 0730   BP: 117/61 130/66 127/70 117/72    24 0829 24 0930   BP: 123/81 118/63       Fetal heart tones: Category 1 tracing, 140s and reactive  Oliver:  Q 3 minutes in a regular pattern    Cervical exam:  4 cm dilated at 60% effacement and-3 station per RN.  This shows a positive change from admission.    Given this patient's cervical change in the last hour or 2 and her consistent contraction powered this is consistent with onset of labor.  Given her thrombocytopenia and being past 37 weeks gestation we will move to labor and delivery for expectant management.  Patient has not had her Lovenox since 9:00 p.m. on 2024.  She was on daily Lovenox for antiphospholipid syndrome per her primary OB.  She does have idiopathic thrombocytopenia of pregnancy.    Patient Active Problem List   Diagnosis    Anti-phospholipid syndrome    History of multiple miscarriages    Supervision of high risk pregnancy in first trimester    - Cervical insufficiency during pregnancy, antepartum    - Recurrent pregnancy loss, currently pregnant    - Antiphospholipid syndrome during pregnancy    - Thyroid nodule     labor    Normal labor    Gestational thrombocytopenia without hemorrhage in third trimester        Plan: Moved to labor and delivery for expected vaginal delivery.

## 2024-12-13 NOTE — H&P
Ante - History & Physical    Chief Complaint: spotting     HPI:      Latoya Gupta is a 24 y.o.  at 37w2d who presents today for evaluation of above chief complaint.    Swelling (IUP 37.1 c/o swelling to BLE, headaches and brown discharge. Had cervical cerclage removed at 36 weeks. )    Presented for spotting, but had a borderline elevated BP in triage, found to have , and ctx q 2-5 mins.    Ctx are mild    No other complaints     No LMP recorded. Patient is pregnant.     OB History    Para Term  AB Living   5 1   1 3     SAB IAB Ectopic Multiple Live Births   3       1      # Outcome Date GA Lbr Kemal/2nd Weight Sex Type Anes PTL Lv   5 Current            4 2024 6w0d    SAB   FD   3 23 13w6d   F    FD      Birth Comments: D&C, partial molar   2  20 20w5d 28:40 0.369 kg (13 oz) M Vag-Spont EPI Y ND      Birth Comments: incompetant cervix      Complications:  premature rupture of membranes (PPROM) with unknown onset of labor   1 2019 6w0d      Y FD       Past Medical History:   Diagnosis Date    Miscarriage     Phospholipid antibody syndrome affecting pregnancy     Thyroid disease     MASS ON THYROID       Past Surgical History:   Procedure Laterality Date    CERVICAL CERCLAGE N/A 2024    Procedure: CERCLAGE, CERVIX;  Surgeon: Nahomi Chun MD;  Location: [facility];  Service: OB/GYN;  Laterality: N/A;    DILATION AND CURETTAGE OF UTERUS  2023       Family History   Problem Relation Name Age of Onset     labor Maternal Grandmother      Miscarriages / Stillbirths Maternal Grandmother      Breast cancer Maternal Grandmother      Thyroid cancer Mother         Social History     Socioeconomic History    Marital status: Significant Other   Tobacco Use    Smoking status: Never     Passive exposure: Never    Smokeless tobacco: Never   Substance and Sexual Activity    Alcohol use: Not Currently    Drug use: Never    Sexual activity:  "Yes     Partners: Male     Birth control/protection: None     Comment: Pain with intercourse       Current Facility-Administered Medications   Medication Dose Route Frequency Provider Last Rate Last Admin    enoxaparin injection 40 mg  40 mg Subcutaneous ED 1 Time Jensen Garcia MD           Review of patient's allergies indicates:  No Known Allergies      Physical Exam:      /68   Pulse 75   Temp 98.4 °F (36.9 °C)   Resp 19   Ht 5' 5" (1.651 m)   Wt 74.8 kg (165 lb)   SpO2 98%   Breastfeeding No   BMI 27.46 kg/m²   Body mass index is 27.46 kg/m².     APPEARANCE: Well nourished, well developed, in no acute distress.  PSYCH: Appropriate mood and affect.  CHEST: Normal respiratory effort,  CV: Normal capillary refill.  ABDOMEN: Soft.  No tenderness or masses.    PELVIC:  2/50/-2 in triage  EXTREMITIES: No edema     EFM reassuring      Results:     Results for orders placed or performed during the hospital encounter of 12/12/24   Comprehensive metabolic panel    Collection Time: 12/12/24  7:57 PM   Result Value Ref Range    Sodium 137 136 - 145 mmol/L    Potassium 4.0 3.5 - 5.1 mmol/L    Chloride 106 98 - 107 mmol/L    CO2 22 22 - 29 mmol/L    Glucose 84 74 - 100 mg/dL    Blood Urea Nitrogen 8.1 7.0 - 18.7 mg/dL    Creatinine 0.59 0.55 - 1.02 mg/dL    Calcium 8.7 8.4 - 10.2 mg/dL    Protein Total 6.3 (L) 6.4 - 8.3 gm/dL    Albumin 3.1 (L) 3.5 - 5.0 g/dL    Globulin 3.2 2.4 - 3.5 gm/dL    Albumin/Globulin Ratio 1.0 (L) 1.1 - 2.0 ratio    Bilirubin Total 0.4 <=1.5 mg/dL     40 - 150 unit/L    ALT 14 0 - 55 unit/L    AST 24 5 - 34 unit/L    eGFR >60 mL/min/1.73/m2    Anion Gap 9.0 mEq/L    BUN/Creatinine Ratio 14    Uric acid    Collection Time: 12/12/24  7:57 PM   Result Value Ref Range    Uric Acid 6.4 (H) 2.6 - 6.0 mg/dL   Lactate Dehydrogenase    Collection Time: 12/12/24  7:57 PM   Result Value Ref Range    Lactate Dehydrogenase 170 125 - 220 U/L   Urinalysis, Reflex to Urine Culture    " Collection Time: 12/12/24  7:57 PM    Specimen: Urine   Result Value Ref Range    Color, UA Light-Yellow Yellow, Light-Yellow, Colorless, Straw, Dark-Yellow    Appearance, UA Clear Clear    Specific Gravity, UA 1.020 1.005 - 1.030    pH, UA 6.5 5.0 - 8.5    Protein, UA Trace (A) Negative    Glucose, UA Normal Negative, Normal    Ketones, UA Negative Negative    Blood, UA Negative Negative    Bilirubin, UA Negative Negative    Urobilinogen, UA 2.0 (A) 0.2, 1.0, Normal    Nitrites, UA Negative Negative    Leukocyte Esterase,  (A) Negative    RBC, UA 0-5 None Seen, 0-2, 3-5, 0-5 /HPF    WBC, UA 6-10 (A) None Seen, 0-2, 3-5, 0-5 /HPF    Bacteria, UA Trace None Seen, Trace /HPF    Squamous Epithelial Cells, UA Occasional (A) None Seen, Trace, Rare /HPF    Mucous, UA Trace (A) None Seen /LPF   Protein/Creatinine Ratio, Urine    Collection Time: 12/12/24  7:57 PM   Result Value Ref Range    Urine Protein Level 11.4 mg/dL    Urine Creatinine 91.0 45.0 - 106.0 mg/dL    Urine Protein/Creatinine Ratio 0.1    CBC with Differential    Collection Time: 12/12/24  7:57 PM   Result Value Ref Range    WBC 9.54 4.50 - 11.50 x10(3)/mcL    RBC 3.84 (L) 4.20 - 5.40 x10(6)/mcL    Hgb 11.8 (L) 12.0 - 16.0 g/dL    Hct 34.6 (L) 37.0 - 47.0 %    MCV 90.1 80.0 - 94.0 fL    MCH 30.7 27.0 - 31.0 pg    MCHC 34.1 33.0 - 36.0 g/dL    RDW 13.1 11.5 - 17.0 %    Platelet 113 (L) 130 - 400 x10(3)/mcL    MPV 12.2 (H) 7.4 - 10.4 fL    Neut % 70.3 %    Lymph % 19.6 %    Mono % 7.8 %    Eos % 0.8 %    Basophil % 0.3 %    Lymph # 1.87 0.6 - 4.6 x10(3)/mcL    Neut # 6.71 2.1 - 9.2 x10(3)/mcL    Mono # 0.74 0.1 - 1.3 x10(3)/mcL    Eos # 0.08 0 - 0.9 x10(3)/mcL    Baso # 0.03 <=0.2 x10(3)/mcL    IG# 0.11 (H) 0 - 0.04 x10(3)/mcL    IG% 1.2 %    NRBC% 0.0 %         Assessment/Plan:     Active Problem List with Overview Notes    Diagnosis Date Noted    - Cervical insufficiency during pregnancy, antepartum 05/22/2024    - Recurrent pregnancy loss, currently  pregnant 05/22/2024    - Antiphospholipid syndrome during pregnancy 05/22/2024    - Thyroid nodule 05/22/2024    Supervision of high risk pregnancy in first trimester 05/01/2024    Anti-phospholipid syndrome 03/14/2024     H/o SABs, 20w loss  March and June 2024 (+) antibodies confirm   ASA + lovenox      History of multiple miscarriages 03/14/2024     vag P        Thrombocytopenia in APLAS at 37w   Continue 24hr urine  Hold lovenox while ctx until seen by MFM  Continue close monitoring    Likely DC today after seen by MFM if ctx continue to be mild    Toy Caraballo MD  12/13/2024 12:23 AM

## 2024-12-13 NOTE — PROGRESS NOTES
Labor Progress Note        Subjective:      Patient currently  comfortable with her epidural .     Objective:      Temp:  [98.4 °F (36.9 °C)-98.5 °F (36.9 °C)] 98.5 °F (36.9 °C)  Pulse:  [] 80  Resp:  [14-19] 14  SpO2:  [92 %-98 %] 97 %  BP: (113-139)/(59-83) 120/63  Body mass index is 27.46 kg/m².     General: no acute distress  Electronic Fetal Monitoring:  FHT: 130s to 140s bpm  variability moderate   accelerations present  decelerations None   Category: 1                 TOCO: Contractions: regular, every 3-5 minutes minutes   Cervix:SVE (PeriWATCH)  Dilation (cm): 5  Effacement (%): 70  Station: -2  Cervical Position: Anterior  Examined by:: Fawad ANTHONY  Simplified Kline Score: 6     Procedures:  AROM  with clear fluid noted on return.  Assessment/Plan:     Continue current plan and anticipate vaginal delivery.  We will augment if needed.     Sean Celestin

## 2024-12-14 PROBLEM — D69.6: Status: ACTIVE | Noted: 2024-12-14

## 2024-12-14 PROBLEM — O26.20: Status: ACTIVE | Noted: 2024-12-14

## 2024-12-14 PROBLEM — D68.61: Status: ACTIVE | Noted: 2024-12-14

## 2024-12-14 PROBLEM — O99.119: Status: ACTIVE | Noted: 2024-12-14

## 2024-12-14 PROBLEM — O34.30: Status: ACTIVE | Noted: 2024-12-14

## 2024-12-14 LAB
ABO + RH BLD: NORMAL
ABO + RH BLD: NORMAL
BASOPHILS # BLD AUTO: 0.02 X10(3)/MCL
BASOPHILS NFR BLD AUTO: 0.1 %
BLD PROD TYP BPU: NORMAL
BLD PROD TYP BPU: NORMAL
BLOOD UNIT EXPIRATION DATE: NORMAL
BLOOD UNIT EXPIRATION DATE: NORMAL
BLOOD UNIT TYPE CODE: 5100
BLOOD UNIT TYPE CODE: 5100
CROSSMATCH INTERPRETATION: NORMAL
CROSSMATCH INTERPRETATION: NORMAL
DISPENSE STATUS: NORMAL
DISPENSE STATUS: NORMAL
EOSINOPHIL # BLD AUTO: 0.01 X10(3)/MCL (ref 0–0.9)
EOSINOPHIL NFR BLD AUTO: 0.1 %
ERYTHROCYTE [DISTWIDTH] IN BLOOD BY AUTOMATED COUNT: 13.2 % (ref 11.5–17)
HCT VFR BLD AUTO: 28.5 % (ref 37–47)
HGB BLD-MCNC: 9.8 G/DL (ref 12–16)
IMM GRANULOCYTES # BLD AUTO: 0.35 X10(3)/MCL (ref 0–0.04)
IMM GRANULOCYTES NFR BLD AUTO: 2 %
LYMPHOCYTES # BLD AUTO: 1.43 X10(3)/MCL (ref 0.6–4.6)
LYMPHOCYTES NFR BLD AUTO: 8.2 %
MCH RBC QN AUTO: 31.5 PG (ref 27–31)
MCHC RBC AUTO-ENTMCNC: 34.4 G/DL (ref 33–36)
MCV RBC AUTO: 91.6 FL (ref 80–94)
MONOCYTES # BLD AUTO: 1.16 X10(3)/MCL (ref 0.1–1.3)
MONOCYTES NFR BLD AUTO: 6.6 %
NEUTROPHILS # BLD AUTO: 14.56 X10(3)/MCL (ref 2.1–9.2)
NEUTROPHILS NFR BLD AUTO: 83 %
NRBC BLD AUTO-RTO: 0 %
PLATELET # BLD AUTO: 110 X10(3)/MCL (ref 130–400)
PMV BLD AUTO: 12.5 FL (ref 7.4–10.4)
RBC # BLD AUTO: 3.11 X10(6)/MCL (ref 4.2–5.4)
UNIT NUMBER: NORMAL
UNIT NUMBER: NORMAL
WBC # BLD AUTO: 17.53 X10(3)/MCL (ref 4.5–11.5)

## 2024-12-14 PROCEDURE — 72100002 HC LABOR CARE, 1ST 8 HOURS

## 2024-12-14 PROCEDURE — 63600175 PHARM REV CODE 636 W HCPCS: Performed by: OBSTETRICS & GYNECOLOGY

## 2024-12-14 PROCEDURE — 51701 INSERT BLADDER CATHETER: CPT

## 2024-12-14 PROCEDURE — 11000001 HC ACUTE MED/SURG PRIVATE ROOM

## 2024-12-14 PROCEDURE — 85025 COMPLETE CBC W/AUTO DIFF WBC: CPT | Performed by: OBSTETRICS & GYNECOLOGY

## 2024-12-14 PROCEDURE — 25000003 PHARM REV CODE 250: Performed by: OBSTETRICS & GYNECOLOGY

## 2024-12-14 PROCEDURE — 10907ZC DRAINAGE OF AMNIOTIC FLUID, THERAPEUTIC FROM PRODUCTS OF CONCEPTION, VIA NATURAL OR ARTIFICIAL OPENING: ICD-10-PCS | Performed by: OBSTETRICS & GYNECOLOGY

## 2024-12-14 PROCEDURE — 72200005 HC VAGINAL DELIVERY LEVEL II

## 2024-12-14 PROCEDURE — 36415 COLL VENOUS BLD VENIPUNCTURE: CPT | Performed by: OBSTETRICS & GYNECOLOGY

## 2024-12-14 PROCEDURE — 0UQMXZZ REPAIR VULVA, EXTERNAL APPROACH: ICD-10-PCS | Performed by: OBSTETRICS & GYNECOLOGY

## 2024-12-14 PROCEDURE — 25000003 PHARM REV CODE 250: Performed by: NURSE PRACTITIONER

## 2024-12-14 PROCEDURE — 99232 SBSQ HOSP IP/OBS MODERATE 35: CPT | Mod: ,,, | Performed by: NURSE PRACTITIONER

## 2024-12-14 RX ORDER — DIPHENOXYLATE HYDROCHLORIDE AND ATROPINE SULFATE 2.5; .025 MG/1; MG/1
2 TABLET ORAL EVERY 6 HOURS PRN
Status: DISCONTINUED | OUTPATIENT
Start: 2024-12-14 | End: 2024-12-15 | Stop reason: HOSPADM

## 2024-12-14 RX ORDER — SODIUM CHLORIDE 0.9 % (FLUSH) 0.9 %
10 SYRINGE (ML) INJECTION
Status: DISCONTINUED | OUTPATIENT
Start: 2024-12-14 | End: 2024-12-15 | Stop reason: HOSPADM

## 2024-12-14 RX ORDER — METHYLERGONOVINE MALEATE 0.2 MG/ML
200 INJECTION INTRAVENOUS ONCE AS NEEDED
Status: DISCONTINUED | OUTPATIENT
Start: 2024-12-14 | End: 2024-12-15 | Stop reason: HOSPADM

## 2024-12-14 RX ORDER — HYDROCODONE BITARTRATE AND ACETAMINOPHEN 5; 325 MG/1; MG/1
1 TABLET ORAL EVERY 6 HOURS PRN
Status: DISCONTINUED | OUTPATIENT
Start: 2024-12-14 | End: 2024-12-15 | Stop reason: HOSPADM

## 2024-12-14 RX ORDER — DOCUSATE SODIUM 100 MG/1
200 CAPSULE, LIQUID FILLED ORAL 2 TIMES DAILY PRN
Status: DISCONTINUED | OUTPATIENT
Start: 2024-12-14 | End: 2024-12-15 | Stop reason: HOSPADM

## 2024-12-14 RX ORDER — OXYTOCIN 10 [USP'U]/ML
10 INJECTION, SOLUTION INTRAMUSCULAR; INTRAVENOUS ONCE AS NEEDED
Status: DISCONTINUED | OUTPATIENT
Start: 2024-12-14 | End: 2024-12-15 | Stop reason: HOSPADM

## 2024-12-14 RX ORDER — MISOPROSTOL 100 UG/1
800 TABLET ORAL ONCE AS NEEDED
Status: DISCONTINUED | OUTPATIENT
Start: 2024-12-14 | End: 2024-12-15 | Stop reason: HOSPADM

## 2024-12-14 RX ORDER — IBUPROFEN 800 MG/1
800 TABLET ORAL EVERY 8 HOURS PRN
Status: DISCONTINUED | OUTPATIENT
Start: 2024-12-14 | End: 2024-12-15 | Stop reason: HOSPADM

## 2024-12-14 RX ORDER — DIPHENHYDRAMINE HYDROCHLORIDE 50 MG/ML
25 INJECTION INTRAMUSCULAR; INTRAVENOUS EVERY 4 HOURS PRN
Status: DISCONTINUED | OUTPATIENT
Start: 2024-12-14 | End: 2024-12-15 | Stop reason: HOSPADM

## 2024-12-14 RX ORDER — SIMETHICONE 80 MG
1 TABLET,CHEWABLE ORAL EVERY 6 HOURS PRN
Status: DISCONTINUED | OUTPATIENT
Start: 2024-12-14 | End: 2024-12-15 | Stop reason: HOSPADM

## 2024-12-14 RX ORDER — OXYTOCIN-SODIUM CHLORIDE 0.9% IV SOLN 30 UNIT/500ML 30-0.9/5 UT/ML-%
20 SOLUTION INTRAVENOUS ONCE AS NEEDED
Status: DISCONTINUED | OUTPATIENT
Start: 2024-12-14 | End: 2024-12-15 | Stop reason: HOSPADM

## 2024-12-14 RX ORDER — ACETAMINOPHEN 325 MG/1
650 TABLET ORAL EVERY 6 HOURS SCHEDULED
Status: DISCONTINUED | OUTPATIENT
Start: 2024-12-14 | End: 2024-12-15 | Stop reason: HOSPADM

## 2024-12-14 RX ORDER — CARBOPROST TROMETHAMINE 250 UG/ML
250 INJECTION, SOLUTION INTRAMUSCULAR
Status: DISCONTINUED | OUTPATIENT
Start: 2024-12-14 | End: 2024-12-15 | Stop reason: HOSPADM

## 2024-12-14 RX ORDER — PRENATAL WITH FERROUS FUM AND FOLIC ACID 3080; 920; 120; 400; 22; 1.84; 3; 20; 10; 1; 12; 200; 27; 25; 2 [IU]/1; [IU]/1; MG/1; [IU]/1; MG/1; MG/1; MG/1; MG/1; MG/1; MG/1; UG/1; MG/1; MG/1; MG/1; MG/1
1 TABLET ORAL DAILY
Status: DISCONTINUED | OUTPATIENT
Start: 2024-12-14 | End: 2024-12-15 | Stop reason: HOSPADM

## 2024-12-14 RX ORDER — ONDANSETRON 4 MG/1
8 TABLET, ORALLY DISINTEGRATING ORAL EVERY 8 HOURS PRN
Status: DISCONTINUED | OUTPATIENT
Start: 2024-12-14 | End: 2024-12-15 | Stop reason: HOSPADM

## 2024-12-14 RX ORDER — OXYTOCIN/0.9 % SODIUM CHLORIDE 15/250 ML
95 PLASTIC BAG, INJECTION (ML) INTRAVENOUS CONTINUOUS PRN
Status: DISCONTINUED | OUTPATIENT
Start: 2024-12-14 | End: 2024-12-15 | Stop reason: HOSPADM

## 2024-12-14 RX ORDER — ENOXAPARIN SODIUM 100 MG/ML
40 INJECTION SUBCUTANEOUS EVERY 24 HOURS
Status: DISCONTINUED | OUTPATIENT
Start: 2024-12-14 | End: 2024-12-15 | Stop reason: HOSPADM

## 2024-12-14 RX ORDER — HYDROCORTISONE 25 MG/G
CREAM TOPICAL 3 TIMES DAILY PRN
Status: DISCONTINUED | OUTPATIENT
Start: 2024-12-14 | End: 2024-12-15 | Stop reason: HOSPADM

## 2024-12-14 RX ORDER — DIPHENHYDRAMINE HCL 25 MG
25 CAPSULE ORAL EVERY 4 HOURS PRN
Status: DISCONTINUED | OUTPATIENT
Start: 2024-12-14 | End: 2024-12-15 | Stop reason: HOSPADM

## 2024-12-14 RX ORDER — OXYTOCIN/0.9 % SODIUM CHLORIDE 15/250 ML
95 PLASTIC BAG, INJECTION (ML) INTRAVENOUS ONCE AS NEEDED
Status: DISCONTINUED | OUTPATIENT
Start: 2024-12-14 | End: 2024-12-15 | Stop reason: HOSPADM

## 2024-12-14 RX ADMIN — IBUPROFEN 800 MG: 800 TABLET, FILM COATED ORAL at 05:12

## 2024-12-14 RX ADMIN — OXYTOCIN 95 MILLI-UNITS/MIN: 10 INJECTION, SOLUTION INTRAMUSCULAR; INTRAVENOUS at 01:12

## 2024-12-14 RX ADMIN — IBUPROFEN 800 MG: 800 TABLET, FILM COATED ORAL at 10:12

## 2024-12-14 RX ADMIN — Medication: at 02:12

## 2024-12-14 RX ADMIN — IBUPROFEN 800 MG: 800 TABLET, FILM COATED ORAL at 02:12

## 2024-12-14 RX ADMIN — ENOXAPARIN SODIUM 40 MG: 40 INJECTION SUBCUTANEOUS at 12:12

## 2024-12-14 RX ADMIN — OXYTOCIN-SODIUM CHLORIDE 0.9% IV SOLN 30 UNIT/500ML 10 UNITS: 30-0.9/5 SOLUTION at 12:12

## 2024-12-14 RX ADMIN — ACETAMINOPHEN 650 MG: 325 TABLET, FILM COATED ORAL at 05:12

## 2024-12-14 NOTE — PLAN OF CARE
Problem:  Fall Injury Risk  Goal: Absence of Fall, Infant Drop and Related Injury  2024 by Ara Lorenzo RN  Outcome: Progressing  2024 by Ara Lorenzo RN  Outcome: Progressing     Problem: Adult Inpatient Plan of Care  Goal: Plan of Care Review  2024 by Ara Lorenzo RN  Outcome: Progressing  2024 by Ara Lorenzo RN  Outcome: Progressing  Goal: Patient-Specific Goal (Individualized)  2024 by Ara Lorenzo RN  Outcome: Progressing  2024 by Ara Lorenzo RN  Outcome: Progressing  Goal: Absence of Hospital-Acquired Illness or Injury  2024 by Ara Lorenzo RN  Outcome: Progressing  2024 by Ara Lorenzo RN  Outcome: Progressing  Goal: Optimal Comfort and Wellbeing  2024 by Ara Lorenzo RN  Outcome: Progressing  2024 by Ara Lorenzo RN  Outcome: Progressing  Goal: Readiness for Transition of Care  2024 by Ara Lorenzo RN  Outcome: Progressing  2024 by Ara Lorenzo RN  Outcome: Progressing     Problem: Infection  Goal: Absence of Infection Signs and Symptoms  2024 by Ara Lorenzo RN  Outcome: Progressing  2024 by Ara Lorenzo RN  Outcome: Progressing     Problem: Labor  Goal: Hemostasis  2024 by Ara Lorenzo RN  Outcome: Progressing  2024 by Ara Lorenzo RN  Outcome: Progressing  Goal: Stable Fetal Wellbeing  2024 by Ara Lorenzo RN  Outcome: Progressing  2024 by Ara Lorenzo RN  Outcome: Progressing  Goal: Effective Progression to Delivery  2024 by Ara Lorenzo RN  Outcome: Progressing  2024 by Ara Lorenzo RN  Outcome: Progressing  Goal: Absence of Infection Signs and Symptoms  2024 by Ara Lorenzo,  RN  Outcome: Progressing  12/13/2024 1913 by Ara Lorenzo RN  Outcome: Progressing  Goal: Acceptable Pain Control  12/13/2024 1913 by Ara Lorenzo RN  Outcome: Progressing  12/13/2024 1913 by Ara Lorenzo RN  Outcome: Progressing  Goal: Normal Uterine Contraction Pattern  12/13/2024 1913 by Ara Lorenzo RN  Outcome: Progressing  12/13/2024 1913 by Ara Lorenzo RN  Outcome: Progressing

## 2024-12-14 NOTE — L&D DELIVERY NOTE
Delivery Note    Obstetrician:   Sean Celestin     Post-Delivery Diagnosis: 24 y.o.  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       Procedure:     Procedure detail: Patient was noted to be completely dilated completely effaced at a +3 station.  She was placed in stirrups and prepped and draped in the usual sterile fashion.  With maternal pushing effort there was controlled delivery of the head followed by delivery of the anterior shoulder then followed by delivery of the posterior shoulder and then followed by delivery of the body.  Mouth and nares were bulb suction.  After pulsation of the cord was nonpalpable the cord was clamped x2 cut and the infant was handed to the nurses.      Anesthesia:Epidural     Episiotomy: None    Laceration: First-degree, Right, and Labia minora and repair with 3-0 chromic in a running fashion.    Indications for instrumental delivery: Not applicable    Instrumental delivery:  Not applicable    Infant info:   Delivery:    Episiotomy: None   Lacerations: 1st   Repair suture:     Repair # of packets: 1   Blood loss (ml):       Birth information:  YOB: 2023   Time of birth: 12:24 AM   Sex: female   Delivery type: Vaginal, Spontaneous   Gestational Age: 37w3d     Measurements    Weight: 3530 g  Weight (lbs): 7 lb 12.5 oz  Length:          Delivery Clinician: Delivery Providers    Delivering clinician: Sean Celestin, DO   Provider Role    Ara Lorenzo RN Registered Nurse    Almaz Lujan RN Registered Nurse    Temo Frazier RN  Registered Nurse             Additional  information:  Forceps:    Vacuum:    Breech:    Observed anomalies      Living?:     Apgars    Living status: Living  Apgar Component Scores:  1 min.:  5 min.:  10 min.:  15 min.:  20 min.:    Skin color:  1  1       Heart rate:  2  2       Reflex irritability:  2  2       Muscle tone:  2  2       Respiratory effort:  1  2       Total:  8  9       Apgars assigned by: TONO MALDONADO RN,JULIA LEERN         Placenta: Delivered:       appearance     Placenta and Cord:Spontaneous, Intact, and Three-vessel cord           Estimated Blood Loss:  100 cc                   Specimens: Cord blood           Complications:  None           Condition: Stable

## 2024-12-14 NOTE — PROGRESS NOTES
Progress Note  Maternal Fetal Medicine          Subjective:      S/P vag delivery at approx midnight    After rounds yesterday, Tylenol helped with headache. Lovenox held throughout the day. Contractions intensified and became more regular. Labor progressed and she delivered vaginally shortly after midnight. Nursing staff denies excessive bleeding post delivery. Lochia less than menses currently. Tolerating PO well. Ambulating around the room without difficulty. Pink infant at bedside.     Routine AM labs with stable PLT count.  All vital signs have remained normal. She feels well and excited to have delivered her baby. Denies headache, visual changes, RUQ pain.     Nursing staff without concerns.     Objective:         Temp:  [97.8 °F (36.6 °C)-100.9 °F (38.3 °C)] 98.6 °F (37 °C)  Pulse:  [63-94] 82  Resp:  [15] 15  SpO2:  [97 %-99 %] 98 %  BP: (100-128)/(56-81) 118/73    Gen: NAD, A&Ox3  Pulm: Unlabored breathing, LCTAB  Card: RRR  Abd: soft, nondistended, nontender to palpation,fundus firm midline and below umbilicus  Extremities: Palpable peripheral pulses, 2+ pitting edema bilat LE, 2+ DTRs x 4, no clonus    Lab Review  Blood Type: O POS    Recent Results (from the past 24 hours)   CBC with Differential    Collection Time: 12/14/24  5:56 AM   Result Value Ref Range    WBC 17.53 (H) 4.50 - 11.50 x10(3)/mcL    RBC 3.11 (L) 4.20 - 5.40 x10(6)/mcL    Hgb 9.8 (L) 12.0 - 16.0 g/dL    Hct 28.5 (L) 37.0 - 47.0 %    MCV 91.6 80.0 - 94.0 fL    MCH 31.5 (H) 27.0 - 31.0 pg    MCHC 34.4 33.0 - 36.0 g/dL    RDW 13.2 11.5 - 17.0 %    Platelet 110 (L) 130 - 400 x10(3)/mcL    MPV 12.5 (H) 7.4 - 10.4 fL    Neut % 83.0 %    Lymph % 8.2 %    Mono % 6.6 %    Eos % 0.1 %    Basophil % 0.1 %    Lymph # 1.43 0.6 - 4.6 x10(3)/mcL    Neut # 14.56 (H) 2.1 - 9.2 x10(3)/mcL    Mono # 1.16 0.1 - 1.3 x10(3)/mcL    Eos # 0.01 0 - 0.9 x10(3)/mcL    Baso # 0.02 <=0.2 x10(3)/mcL    IG# 0.35 (H) 0 - 0.04 x10(3)/mcL    IG% 2.0 %    NRBC% 0.0 %        Assessment:       24 y.o.  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        Plan:     1. Edema (POA)  Discussed possible etiologies including, but not limited to, advancing gestation and HTN disorders of pregnancy.   - All Bps normal since hospital arrival. Denies visual disturbances/RUQ pain.  - Pre-e workup unremarkable aside from PLT (discussed below). Labs repeated and normal.  - Hx migraines. Tylenol PRN.     2. Thrombocytopenia  - New finding as of  PM (PLT 113K). Routine 3rd trimester labs w/ PLT at 171K on 24.  - Gestational thrombocytopenia vs atypical presentation of pre-eclampsia.   - Repeat PLT  and  AM stable.  - Without other lab abnormalities and/or s/s suggestive of pre-eclampsia and normal BPs, likely gestational thrombocytopenia. Briefly discussed this condition.   - With repeat labs showing stable PLT, no treatment indicated.       3. APLS  - Currently on ppx Lovenox. Last dose 24 PM. Held PM dose  PM and  d/t contractions. Now s/p NVD.  - Plan to resume and continue x 6weeks. Received epidural anesthesia- anticoagulation should not be initiated any sooner than 12 hours postpartum and must be at least 4 hours since epidural removal (if had epidural). Recommend resuming ppx Lovenox 12h after delivery (approx 1224 today)      4. Cervical insufficiency (resolved)  - s/p NVD       MFM will sign off at this time. Please do not hesitate to contact our services with any  questions/concerns.  Thank you for allowing us to participate in the care of your patient. If you have any questions/concerns, please do not hesitate to contact us.     Queenie Lieberman, MSN, APRN, NP-BC  Maternal Fetal Medicine

## 2024-12-15 VITALS
DIASTOLIC BLOOD PRESSURE: 75 MMHG | RESPIRATION RATE: 18 BRPM | OXYGEN SATURATION: 98 % | TEMPERATURE: 98 F | HEIGHT: 65 IN | HEART RATE: 76 BPM | SYSTOLIC BLOOD PRESSURE: 114 MMHG | WEIGHT: 165 LBS | BODY MASS INDEX: 27.49 KG/M2

## 2024-12-15 PROBLEM — Z37.9 NORMAL LABOR: Status: RESOLVED | Noted: 2024-12-13 | Resolved: 2024-12-15

## 2024-12-15 PROBLEM — O34.30: Status: RESOLVED | Noted: 2024-12-14 | Resolved: 2024-12-15

## 2024-12-15 PROBLEM — O34.30 CERVICAL INSUFFICIENCY DURING PREGNANCY, ANTEPARTUM: Status: RESOLVED | Noted: 2024-05-22 | Resolved: 2024-12-15

## 2024-12-15 PROCEDURE — 25000003 PHARM REV CODE 250: Performed by: OBSTETRICS & GYNECOLOGY

## 2024-12-15 PROCEDURE — 25000003 PHARM REV CODE 250: Performed by: NURSE PRACTITIONER

## 2024-12-15 PROCEDURE — 63600175 PHARM REV CODE 636 W HCPCS: Performed by: OBSTETRICS & GYNECOLOGY

## 2024-12-15 RX ORDER — IBUPROFEN 800 MG/1
800 TABLET ORAL EVERY 8 HOURS PRN
Qty: 40 TABLET | Refills: 0 | Status: SHIPPED | OUTPATIENT
Start: 2024-12-15

## 2024-12-15 RX ADMIN — ACETAMINOPHEN 650 MG: 325 TABLET, FILM COATED ORAL at 12:12

## 2024-12-15 RX ADMIN — IBUPROFEN 800 MG: 800 TABLET, FILM COATED ORAL at 10:12

## 2024-12-15 RX ADMIN — ENOXAPARIN SODIUM 40 MG: 40 INJECTION SUBCUTANEOUS at 02:12

## 2024-12-15 RX ADMIN — IBUPROFEN 800 MG: 800 TABLET, FILM COATED ORAL at 02:12

## 2024-12-15 NOTE — ASSESSMENT & PLAN NOTE
- Patient doing well. Continue routine management and advances.  - Continue PO pain meds. Pain well controlled.  - Pre H/H 11.2/33.3 --> post H/H 9.8/28.5; VSS, asymptomatic  - Encourage ambulation  - Circumcision - N/A  - Contraception -  Will discuss at postpartum visit  - Lactation - bottle feeding

## 2024-12-15 NOTE — DISCHARGE SUMMARY
[facility] - 2nd Floor Mother/Baby Unit  Obstetrics  Discharge Summary      Patient Name: Latoya Gupta  MRN: [id number]  Admission Date: 2024  Hospital Length of Stay: 1 days  Discharge Date and Time:  12/15/2024 11:37 AM  Attending Physician: Toy Caraballo MD   Discharging Provider: Moo Martinez MD   Primary Care Provider: Nahomi Pete FNP    HPI: Latoya Gupta is a 24 y.o.  at 37w2d who presents today for evaluation of above chief complaint.     Swelling (IUP 37.1 c/o swelling to BLE, headaches and brown discharge. Had cervical cerclage removed at 36 weeks. )     Presented for spotting, but had a borderline elevated BP in triage, found to have , and ctx q 2-5 mins.           * No surgery found *     Hospital Course:   12/15/2024: PPD#1 s/p . Patient reports that she is doing well. She is ambulating, voiding, and tolerating PO. She reports that her pain is well controlled and lochia is mild. She is bottled feeding her female infant.     Consults (From admission, onward)          Status Ordering Provider     Inpatient consult to Anesthesiology  Once        Provider:  (Not yet assigned)    Acknowledged ITZ MUELLER     Inpatient consult to Maternal Fetal Medicine  Once        Provider:  Nahomi Chun MD    Completed EVANGELINA ROBLEDO            Final Active Diagnoses:    Diagnosis Date Noted POA    PRINCIPAL PROBLEM:   (normal spontaneous vaginal delivery) [O80] 2024 Not Applicable    Recurrent pregnancy loss, delivered [O26.20] 2024 Unknown    Gestational thrombocytopenia without hemorrhage, unspecified trimester [O99.119, D69.6] 2024 Unknown    Antiphospholipid syndrome complic pregnancy, deliv, curr hospitaliz [O99.12, D68.61] 2024 Yes    Gestational thrombocytopenia without hemorrhage in third trimester [O99.113, D69.6] 2024 Yes    - Antiphospholipid syndrome during pregnancy [O99.119, D68.61]  "2024 Yes    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] 2024 Yes      Problems Resolved During this Admission:    Diagnosis Date Noted Date Resolved POA    Edema during pregnancy, delivered [O12.04] 2024 12/15/2024 Unknown    Cervical insufficiency during pregnancy, antepartum, unspecified trimester [O34.30] 2024 12/15/2024 Unknown    Normal labor [O80, Z37.9] 2024 12/15/2024 Not Applicable    - Cervical insufficiency during pregnancy, antepartum [O34.30] 2024 12/15/2024 Yes        Significant Diagnostic Studies: Labs: CBC   Recent Labs   Lab 24  0556   WBC 17.53*   HGB 9.8*   HCT 28.5*   *         Feeding Method: bottle    Immunizations       Date Immunization Status Dose Route/Site Given by    24 015 MMR Incomplete 0.5 mL Subcutaneous/     24 015 Tdap Incomplete 0.5 mL Intramuscular/             Delivery:    Episiotomy: None   Lacerations: None   Repair suture:     Repair # of packets: 1   Blood loss (ml):       Birth information:  YOB: 2023   Time of birth: 12:24 AM   Sex: female   Delivery type: Vaginal, Spontaneous   Gestational Age: 37w3d     Measurements    Weight: 3530 g  Weight (lbs): 7 lb 12.5 oz  Length: 50.8 cm  Length (in): 20"  Head circumference: 33.7 cm         Delivery Clinician: Delivery Providers    Delivering clinician: Sean Celestin,    Provider Role    Ara Lorenzo RN Registered Nurse    Almaz Lujan RN Registered Nurse    Temo Maldonado RN Registered Nurse             Additional  information:  Forceps:    Vacuum:    Breech:    Observed anomalies      Living?:     Apgars    Living status: Living  Apgar Component Scores:  1 min.:  5 min.:  10 min.:  15 min.:  20 min.:    Skin color:  1  1       Heart rate:  2  2       Reflex irritability:  2  2       Muscle tone:  2  2       Respiratory effort:  1  2       Total:  8  9       Apgars assigned by: TONO MALDONADO RN,JULIA LEE RN         Placenta: Delivered:     "   appearance  Pending Diagnostic Studies:       None            Discharged Condition: good    Disposition: Home or Self Care    Follow Up:   Follow-up Information       Toy Caraballo MD Follow up in 6 week(s).    Specialty: Obstetrics and Gynecology  Why: post partum  Contact information:  [place] [id number]  [phone]                           Patient Instructions:      Diet Adult Regular     Pelvic Rest     Notify your health care provider if you experience any of the following:  temperature >100.4     Notify your health care provider if you experience any of the following:  persistent nausea and vomiting or diarrhea     Notify your health care provider if you experience any of the following:  severe uncontrolled pain     Notify your health care provider if you experience any of the following:  difficulty breathing or increased cough     Notify your health care provider if you experience any of the following:  severe persistent headache     Notify your health care provider if you experience any of the following:  worsening rash     Notify your health care provider if you experience any of the following:  persistent dizziness, light-headedness, or visual disturbances     Notify your health care provider if you experience any of the following:  increased confusion or weakness     Activity as tolerated     Medications:  Current Discharge Medication List        START taking these medications    Details   ibuprofen (ADVIL,MOTRIN) 800 MG tablet Take 1 tablet (800 mg total) by mouth every 8 (eight) hours as needed for Pain.  Qty: 40 tablet, Refills: 0    Associated Diagnoses:  (normal spontaneous vaginal delivery)           CONTINUE these medications which have NOT CHANGED    Details   aspirin (ECOTRIN) 81 MG EC tablet Take 1 tablet (81 mg total) by mouth once daily.  Qty: 30 tablet, Refills: 11    Associated Diagnoses: Normal pregnancy in first trimester; Anti-phospholipid syndrome;  History of multiple miscarriages; Early stage of pregnancy      enoxaparin (LOVENOX) 40 mg/0.4 mL Syrg INJECT 0.4 MLS INTO THE SKIN EVERY DAY  Qty: 11.2 mL, Refills: 6    Associated Diagnoses: Anti-phospholipid syndrome; Supervision of high risk pregnancy in third trimester      ferrous sulfate (FEOSOL) 325 mg (65 mg iron) Tab tablet Take 325 mg by mouth once daily.      prenatal vit no.130-iron-folic (PRENATAL VITAMIN) 27 mg iron- 800 mcg Tab Take 1 tablet by mouth Daily.  Qty: 90 tablet, Refills: 3           STOP taking these medications       progesterone (PROMETRIUM) 200 MG capsule Comments:   Reason for Stopping:               Moo Martinez MD  Obstetrics  [facility] - 2nd Floor Mother/Baby Unit

## 2024-12-15 NOTE — HPI
Latoya Gupta is a 24 y.o.  at 37w2d who presents today for evaluation of above chief complaint.     Swelling (IUP 37.1 c/o swelling to BLE, headaches and brown discharge. Had cervical cerclage removed at 36 weeks. )     Presented for spotting, but had a borderline elevated BP in triage, found to have , and ctx q 2-5 mins.

## 2024-12-15 NOTE — HOSPITAL COURSE
12/15/2024: PPD#1 s/p . Patient reports that she is doing well. She is ambulating, voiding, and tolerating PO. She reports that her pain is well controlled and lochia is mild. She is bottled feeding her female infant.

## 2024-12-15 NOTE — SUBJECTIVE & OBJECTIVE
Interval History:   She is doing well this morning. She is tolerating a regular diet without nausea or vomiting. She is voiding spontaneously. She is ambulating. She has passed flatus, and has not a BM. Vaginal bleeding is mild. She denies fever or chills. Abdominal pain is mild and controlled with oral medications. She Is not breastfeeding. She desires circumcision for her male baby: not applicable.    Objective:     Vital Signs (Most Recent):  Temp: 98.1 °F (36.7 °C) (12/15/24 0819)  Pulse: 76 (12/15/24 0819)  Resp: 18 (12/15/24 0819)  BP: 114/75 (12/15/24 0819)  SpO2: 98 % (12/15/24 0819) Vital Signs (24h Range):  Temp:  [98 °F (36.7 °C)-98.6 °F (37 °C)] 98.1 °F (36.7 °C)  Pulse:  [76-82] 76  Resp:  [18] 18  SpO2:  [97 %-98 %] 98 %  BP: (114-130)/(70-75) 114/75     Weight: 74.8 kg (165 lb)  Body mass index is 27.46 kg/m².    No intake or output data in the 24 hours ending 12/15/24 1131      Significant Labs:  Lab Results   Component Value Date    GROUPTRH O POS 12/13/2024    HEPBSAG Nonreactive 12/13/2024     Recent Labs   Lab 12/14/24  0556   HGB 9.8*   HCT 28.5*       CBC:   Recent Labs   Lab 12/14/24  0556   WBC 17.53*   RBC 3.11*   HGB 9.8*   HCT 28.5*   *   MCV 91.6   MCH 31.5*   MCHC 34.4     I have personallly reviewed all pertinent lab results from the last 24 hours.    Physical Exam:   Constitutional: She is oriented to person, place, and time. She appears well-developed and well-nourished. No distress.       Cardiovascular:  Normal rate.             Pulmonary/Chest: Effort normal.        Abdominal: Soft. She exhibits no distension.                 Neurological: She is alert and oriented to person, place, and time.    Skin: Skin is warm and dry.    Psychiatric: She has a normal mood and affect.       Review of Systems   Constitutional:  Negative for chills and fever.   Eyes:  Negative for visual disturbance.   Respiratory:  Negative for cough and wheezing.    Cardiovascular:  Negative for chest  pain and palpitations.   Gastrointestinal:  Negative for abdominal pain, nausea and vomiting.   Genitourinary:  Negative for dysuria, frequency, hematuria, pelvic pain, vaginal bleeding, vaginal discharge and vaginal pain.   Neurological:  Negative for headaches.   Psychiatric/Behavioral:  Negative for depression.

## 2024-12-15 NOTE — PLAN OF CARE
Problem:  Fall Injury Risk  Goal: Absence of Fall, Infant Drop and Related Injury  2024 by Alka Tinoco RN  Outcome: Progressing  2024 by Alka Tinoco RN  Outcome: Progressing     Problem: Adult Inpatient Plan of Care  Goal: Plan of Care Review  2024 by Alka Tinoco RN  Outcome: Progressing  2024 by Alka Tinoco RN  Outcome: Progressing  Goal: Patient-Specific Goal (Individualized)  2024 by Alka Tinoco RN  Outcome: Progressing  2024 by Alka Tinoco RN  Outcome: Progressing  Goal: Absence of Hospital-Acquired Illness or Injury  2024 by Alka Tinoco RN  Outcome: Progressing  2024 by Alka Tinoco RN  Outcome: Progressing  Goal: Optimal Comfort and Wellbeing  2024 by Alka Tinoco RN  Outcome: Progressing  2024 by Alka Tinoco RN  Outcome: Progressing  Goal: Readiness for Transition of Care  2024 by Alka Tinoco RN  Outcome: Progressing  2024 by Alka Tinoco RN  Outcome: Progressing     Problem: Infection  Goal: Absence of Infection Signs and Symptoms  2024 by Alka Tinoco RN  Outcome: Progressing  2024 by Alka Tinoco RN  Outcome: Progressing     Problem: Labor  Goal: Hemostasis  2024 by Alka Tinoco RN  Outcome: Progressing  2024 by Alka Tinoco RN  Outcome: Progressing  Goal: Stable Fetal Wellbeing  2024 by Alka Tinoco RN  Outcome: Progressing  2024 by Alka Tinoco RN  Outcome: Progressing  Goal: Effective Progression to Delivery  2024 by Alka Tinoco RN  Outcome: Progressing  2024 by Alka Tinoco RN  Outcome: Progressing  Goal: Absence of Infection Signs and Symptoms  2024 by Alka Tinoco, RN  Outcome: Progressing  2024 by Alka Tinoco, RN  Outcome: Progressing  Goal: Acceptable Pain Control  2024  2047 by Alka Tinoco RN  Outcome: Progressing  12/14/2024 2047 by Alka Tinoco RN  Outcome: Progressing  Goal: Normal Uterine Contraction Pattern  12/14/2024 2047 by Alka Tinoco RN  Outcome: Progressing  12/14/2024 2047 by Alka Tinoco RN  Outcome: Progressing     Problem: Postpartum (Vaginal Delivery)  Goal: Successful Parent Role Transition  12/14/2024 2047 by Alka Tinoco RN  Outcome: Progressing  12/14/2024 2047 by Alka Tinoco RN  Outcome: Progressing  Goal: Hemostasis  12/14/2024 2047 by Alka Tinoco RN  Outcome: Progressing  12/14/2024 2047 by Alka Tinoco RN  Outcome: Progressing  Goal: Absence of Infection Signs and Symptoms  12/14/2024 2047 by Alka Tinoco RN  Outcome: Progressing  12/14/2024 2047 by Alka Tinoco RN  Outcome: Progressing  Goal: Anesthesia/Sedation Recovery  12/14/2024 2047 by Alka Tinoco RN  Outcome: Progressing  12/14/2024 2047 by Alka Tinoco RN  Outcome: Progressing  Goal: Optimal Pain Control and Function  12/14/2024 2047 by Alka Tinoco RN  Outcome: Progressing  12/14/2024 2047 by Alka Tinoco RN  Outcome: Progressing  Goal: Effective Urinary Elimination  12/14/2024 2047 by Alka Tinoco RN  Outcome: Progressing  12/14/2024 2047 by Alka Tinoco RN  Outcome: Progressing

## 2024-12-15 NOTE — PROGRESS NOTES
[facility] - 2nd Floor Mother/Baby Unit  Obstetrics  Postpartum Progress Note    Patient Name: Latoya Gupta  MRN: [id number]  Admission Date: 2024  Hospital Length of Stay: 1 days  Attending Physician: Toy Caraballo MD  Primary Care Provider: Nahomi Pete FNP    Subjective:     Principal Problem: (normal spontaneous vaginal delivery)    Hospital Course:  12/15/2024: PPD#1 s/p . Patient reports that she is doing well. She is ambulating, voiding, and tolerating PO. She reports that her pain is well controlled and lochia is mild. She is bottled feeding her female infant.    Interval History:   She is doing well this morning. She is tolerating a regular diet without nausea or vomiting. She is voiding spontaneously. She is ambulating. She has passed flatus, and has not a BM. Vaginal bleeding is mild. She denies fever or chills. Abdominal pain is mild and controlled with oral medications. She Is not breastfeeding. She desires circumcision for her male baby: not applicable.    Objective:     Vital Signs (Most Recent):  Temp: 98.1 °F (36.7 °C) (12/15/24 0819)  Pulse: 76 (12/15/24 0819)  Resp: 18 (12/15/24 0819)  BP: 114/75 (12/15/24 0819)  SpO2: 98 % (12/15/24 0819) Vital Signs (24h Range):  Temp:  [98 °F (36.7 °C)-98.6 °F (37 °C)] 98.1 °F (36.7 °C)  Pulse:  [76-82] 76  Resp:  [18] 18  SpO2:  [97 %-98 %] 98 %  BP: (114-130)/(70-75) 114/75     Weight: 74.8 kg (165 lb)  Body mass index is 27.46 kg/m².    No intake or output data in the 24 hours ending 12/15/24 1131      Significant Labs:  Lab Results   Component Value Date    GROUPTRH O POS 2024    HEPBSAG Nonreactive 2024     Recent Labs   Lab 24  0556   HGB 9.8*   HCT 28.5*       CBC:   Recent Labs   Lab 24  0556   WBC 17.53*   RBC 3.11*   HGB 9.8*   HCT 28.5*   *   MCV 91.6   MCH 31.5*   MCHC 34.4     I have personallly reviewed all pertinent lab results from the last 24 hours.    Physical Exam:    Constitutional: She is oriented to person, place, and time. She appears well-developed and well-nourished. No distress.       Cardiovascular:  Normal rate.             Pulmonary/Chest: Effort normal.        Abdominal: Soft. She exhibits no distension.                 Neurological: She is alert and oriented to person, place, and time.    Skin: Skin is warm and dry.    Psychiatric: She has a normal mood and affect.       Review of Systems   Constitutional:  Negative for chills and fever.   Eyes:  Negative for visual disturbance.   Respiratory:  Negative for cough and wheezing.    Cardiovascular:  Negative for chest pain and palpitations.   Gastrointestinal:  Negative for abdominal pain, nausea and vomiting.   Genitourinary:  Negative for dysuria, frequency, hematuria, pelvic pain, vaginal bleeding, vaginal discharge and vaginal pain.   Neurological:  Negative for headaches.   Psychiatric/Behavioral:  Negative for depression.      Assessment/Plan:     24 y.o. female  for:    *  (normal spontaneous vaginal delivery)  - Patient doing well. Continue routine management and advances.  - Continue PO pain meds. Pain well controlled.  - Pre H/H 11.2/33.3 --> post H/H 9.8/28.5; VSS, asymptomatic  - Encourage ambulation  - Circumcision - N/A  - Contraception -  Will discuss at postpartum visit  - Lactation - bottle feeding      Gestational thrombocytopenia without hemorrhage in third trimester  - Platelets 113k >110k  - No issues at this time        Disposition: As patient meets milestones, will plan to discharge today per patient's request.    Moo Martinez MD  Obstetrics  [facility] - 2nd Floor Mother/Baby Unit

## 2024-12-18 NOTE — ED PROVIDER NOTES
Encounter Date: 2024    SCRIBE #1 NOTE: I, am scribing for, and in the presence of,  . I have scribed the entire note.       History     Chief Complaint   Patient presents with    Swelling     IUP 37.1 c/o swelling to BLE, headaches and brown discharge. Had cervical cerclage removed at 36 weeks.      HPI  Review of patient's allergies indicates:  No Known Allergies  Past Medical History:   Diagnosis Date    Miscarriage     Phospholipid antibody syndrome affecting pregnancy     Thyroid disease     MASS ON THYROID     Past Surgical History:   Procedure Laterality Date    CERVICAL CERCLAGE N/A 2024    Procedure: CERCLAGE, CERVIX;  Surgeon: Nahomi Chun MD;  Location: [facility] OR;  Service: OB/GYN;  Laterality: N/A;    DILATION AND CURETTAGE OF UTERUS  2023     Family History   Problem Relation Name Age of Onset     labor Maternal Grandmother      Miscarriages / Stillbirths Maternal Grandmother      Breast cancer Maternal Grandmother      Thyroid cancer Mother       Social History     Tobacco Use    Smoking status: Never     Passive exposure: Never    Smokeless tobacco: Never   Substance Use Topics    Alcohol use: Not Currently    Drug use: Never     Review of Systems    Physical Exam     Initial Vitals   BP Pulse Resp Temp SpO2   24 1931 24 1931 24 1931 24 19324   139/81 80 18 98.4 °F (36.9 °C) 97 %      MAP       --                Physical Exam    ED Course   Procedures  Labs Reviewed   COMPREHENSIVE METABOLIC PANEL - Abnormal       Result Value    Sodium 137      Potassium 4.0      Chloride 106      CO2 22      Glucose 84      Blood Urea Nitrogen 8.1      Creatinine 0.59      Calcium 8.7      Protein Total 6.3 (*)     Albumin 3.1 (*)     Globulin 3.2      Albumin/Globulin Ratio 1.0 (*)     Bilirubin Total 0.4            ALT 14      AST 24      eGFR >60      Anion Gap 9.0      BUN/Creatinine Ratio 14     URIC ACID - Abnormal    Uric Acid 6.4 (*)     URINALYSIS, REFLEX TO URINE CULTURE - Abnormal    Color, UA Light-Yellow      Appearance, UA Clear      Specific Gravity, UA 1.020      pH, UA 6.5      Protein, UA Trace (*)     Glucose, UA Normal      Ketones, UA Negative      Blood, UA Negative      Bilirubin, UA Negative      Urobilinogen, UA 2.0 (*)     Nitrites, UA Negative      Leukocyte Esterase,  (*)     RBC, UA 0-5      WBC, UA 6-10 (*)     Bacteria, UA Trace      Squamous Epithelial Cells, UA Occasional (*)     Mucous, UA Trace (*)    CBC WITH DIFFERENTIAL - Abnormal    WBC 9.54      RBC 3.84 (*)     Hgb 11.8 (*)     Hct 34.6 (*)     MCV 90.1      MCH 30.7      MCHC 34.1      RDW 13.1      Platelet 113 (*)     MPV 12.2 (*)     Neut % 70.3      Lymph % 19.6      Mono % 7.8      Eos % 0.8      Basophil % 0.3      Lymph # 1.87      Neut # 6.71      Mono # 0.74      Eos # 0.08      Baso # 0.03      IG# 0.11 (*)     IG% 1.2      NRBC% 0.0     LACTATE DEHYDROGENASE - Normal    Lactate Dehydrogenase 170     CBC W/ AUTO DIFFERENTIAL    Narrative:     The following orders were created for panel order CBC auto differential.  Procedure                               Abnormality         Status                     ---------                               -----------         ------                     CBC with Differential[[id number]]       Abnormal            Final result                 Please view results for these tests on the individual orders.   PROTEIN / CREATININE RATIO, URINE    Urine Protein Level 11.4      Urine Creatinine 91.0      Urine Protein/Creatinine Ratio 0.1            Imaging Results    None          Medications   enoxaparin injection 40 mg (40 mg Subcutaneous Not Given 12/12/24 3138)   oxytocin 15 units/250 mL (60 milliunits/mL) in 0.9% NaCl IV bolus from bag (10 Units Intravenous Bolus from Bag 12/14/24 0025)   sodium citrate-citric acid 500-334 mg/5 ml solution 30 mL (30 mLs Oral Given 12/13/24 1513)   lactated ringers bolus 500 mL (  Intravenous Verify Only 24 0151)   acetaminophen tablet 650 mg (650 mg Oral Given 24 8142)     Medical Decision Making                                    Clinical Impression:  Final diagnoses:  [O99.119, D68.61] Antiphospholipid antibody syndrome complicating pregnancy     The  @37 3/7 weeks gestation  Pre-eclampsia workup negative  Thrombocytopenia noted  Symptoms of labile blood pressures and headaches minimized    A/P: Observation  -M consult  -discussed with Dr. Caraballo       ED Disposition Condition    Observation                 Jensen Garcia MD  24 [id number]

## 2024-12-18 NOTE — ED TRIAGE NOTES
Patient presented with complaints of lower abdominal pain, and reported elevated blood pressure and headaches  And noted APLAb syndrome on lovenox. Incidental finding of low platelet. Despite negative pre-eclampsia workup.   With labile blood pressures and thrombocytopenia  The patient was kept for observation as per Dr. Celestin    A/P: Observation  -APAS  -discussed with Dr. Caraballo

## (undated) DEVICE — SPONGE GAUZE 16PLY 4X4

## (undated) DEVICE — GLOVE SENSICARE PI GRN 6.5

## (undated) DEVICE — PAD PREP CUFFED NS 24X48IN

## (undated) DEVICE — PAD CURITY MATERNITY PERI

## (undated) DEVICE — GOWN POLY REINF BRTH SLV XL

## (undated) DEVICE — DRAPE UND BUTT W/POUCH 40X44IN

## (undated) DEVICE — Device

## (undated) DEVICE — BOWL STERILE LG GRAD 32OZ

## (undated) DEVICE — COVER PROXIMA MAYO STAND

## (undated) DEVICE — TRAY SKIN SCRUB WET PREMIUM

## (undated) DEVICE — TUBE SUCTION MEDI-VAC STERILE

## (undated) DEVICE — YANKAUER FLEX NO VENT REG CAP

## (undated) DEVICE — JELLY SURGILUBE LUBE TUBE 2OZ

## (undated) DEVICE — PROTECTOR ULNAR NERVE FOAM

## (undated) DEVICE — SUT 2/0 30IN PROLENE MONO

## (undated) DEVICE — SUT MERSILENE 5-0 RS22

## (undated) DEVICE — COVER HANDLE LIGHT RIGID

## (undated) DEVICE — SOL NACL IRR 1000ML BTL

## (undated) DEVICE — SYR IRRIGATION BULB STER 60ML

## (undated) DEVICE — GLOVE SIGNATURE MICRO LTX 6